# Patient Record
Sex: MALE | Race: WHITE | Employment: FULL TIME | URBAN - METROPOLITAN AREA
[De-identification: names, ages, dates, MRNs, and addresses within clinical notes are randomized per-mention and may not be internally consistent; named-entity substitution may affect disease eponyms.]

---

## 2020-06-19 ENCOUNTER — HOSPITAL ENCOUNTER (OUTPATIENT)
Dept: PREADMISSION TESTING | Age: 65
Discharge: HOME | End: 2020-06-19
Payer: COMMERCIAL

## 2020-06-19 DIAGNOSIS — Z01.818 PREOPERATIVE EXAMINATION, UNSPECIFIED: ICD-10-CM

## 2020-06-19 LAB
ALBUMIN SERPL-MCNC: 4.4 G/DL (ref 3.4–5)
ALBUMIN/GLOB SERPL: 1.5 {RATIO} (ref 0.8–1.7)
ALP SERPL-CCNC: 52 U/L (ref 45–117)
ALT SERPL-CCNC: 32 U/L (ref 16–61)
ANION GAP SERPL CALC-SCNC: 5 MMOL/L (ref 3–18)
APTT PPP: 25.5 SEC (ref 23–36.4)
AST SERPL-CCNC: 30 U/L (ref 10–38)
ATRIAL RATE: 59 BPM
BASOPHILS # BLD: 0 K/UL (ref 0–0.1)
BASOPHILS NFR BLD: 0 % (ref 0–2)
BILIRUB SERPL-MCNC: 1.2 MG/DL (ref 0.2–1)
BUN SERPL-MCNC: 14 MG/DL (ref 7–18)
BUN/CREAT SERPL: 17 (ref 12–20)
CALCIUM SERPL-MCNC: 9.3 MG/DL (ref 8.5–10.1)
CALCULATED P AXIS, ECG09: 69 DEGREES
CALCULATED R AXIS, ECG10: -3 DEGREES
CALCULATED T AXIS, ECG11: 53 DEGREES
CHLORIDE SERPL-SCNC: 103 MMOL/L (ref 100–111)
CO2 SERPL-SCNC: 31 MMOL/L (ref 21–32)
CREAT SERPL-MCNC: 0.84 MG/DL (ref 0.6–1.3)
DIAGNOSIS, 93000: NORMAL
DIFFERENTIAL METHOD BLD: ABNORMAL
EOSINOPHIL # BLD: 0 K/UL (ref 0–0.4)
EOSINOPHIL NFR BLD: 0 % (ref 0–5)
ERYTHROCYTE [DISTWIDTH] IN BLOOD BY AUTOMATED COUNT: 13.3 % (ref 11.6–14.5)
ERYTHROCYTE [SEDIMENTATION RATE] IN BLOOD: 4 MM/HR (ref 0–20)
EST. AVERAGE GLUCOSE BLD GHB EST-MCNC: 123 MG/DL
GLOBULIN SER CALC-MCNC: 2.9 G/DL (ref 2–4)
GLUCOSE SERPL-MCNC: 122 MG/DL (ref 74–99)
HBA1C MFR BLD: 5.9 % (ref 4.2–5.6)
HCT VFR BLD AUTO: 43.4 % (ref 36–48)
HGB BLD-MCNC: 14.1 G/DL (ref 13–16)
INR PPP: 0.9 (ref 0.8–1.2)
LYMPHOCYTES # BLD: 0.8 K/UL (ref 0.9–3.6)
LYMPHOCYTES NFR BLD: 16 % (ref 21–52)
MCH RBC QN AUTO: 33.1 PG (ref 24–34)
MCHC RBC AUTO-ENTMCNC: 32.5 G/DL (ref 31–37)
MCV RBC AUTO: 101.9 FL (ref 74–97)
MONOCYTES # BLD: 0.4 K/UL (ref 0.05–1.2)
MONOCYTES NFR BLD: 9 % (ref 3–10)
NEUTS SEG # BLD: 3.7 K/UL (ref 1.8–8)
NEUTS SEG NFR BLD: 75 % (ref 40–73)
P-R INTERVAL, ECG05: 208 MS
PLATELET # BLD AUTO: 263 K/UL (ref 135–420)
PMV BLD AUTO: 9.4 FL (ref 9.2–11.8)
POTASSIUM SERPL-SCNC: 4.2 MMOL/L (ref 3.5–5.5)
PROT SERPL-MCNC: 7.3 G/DL (ref 6.4–8.2)
PROTHROMBIN TIME: 12.2 SEC (ref 11.5–15.2)
Q-T INTERVAL, ECG07: 440 MS
QRS DURATION, ECG06: 114 MS
QTC CALCULATION (BEZET), ECG08: 435 MS
RBC # BLD AUTO: 4.26 M/UL (ref 4.7–5.5)
SODIUM SERPL-SCNC: 139 MMOL/L (ref 136–145)
VENTRICULAR RATE, ECG03: 59 BPM
WBC # BLD AUTO: 4.9 K/UL (ref 4.6–13.2)

## 2020-06-19 PROCEDURE — 93005 ELECTROCARDIOGRAM TRACING: CPT

## 2020-06-19 PROCEDURE — 36415 COLL VENOUS BLD VENIPUNCTURE: CPT

## 2020-06-19 PROCEDURE — 83036 HEMOGLOBIN GLYCOSYLATED A1C: CPT

## 2020-06-19 PROCEDURE — 80053 COMPREHEN METABOLIC PANEL: CPT

## 2020-06-19 PROCEDURE — 85652 RBC SED RATE AUTOMATED: CPT

## 2020-06-19 PROCEDURE — 87086 URINE CULTURE/COLONY COUNT: CPT

## 2020-06-19 PROCEDURE — 85610 PROTHROMBIN TIME: CPT

## 2020-06-19 PROCEDURE — 85730 THROMBOPLASTIN TIME PARTIAL: CPT

## 2020-06-19 PROCEDURE — 85025 COMPLETE CBC W/AUTO DIFF WBC: CPT

## 2020-06-20 LAB
BACTERIA SPEC CULT: NORMAL
SERVICE CMNT-IMP: NORMAL

## 2020-06-21 LAB
BACTERIA SPEC CULT: NORMAL
BACTERIA SPEC CULT: NORMAL
SERVICE CMNT-IMP: NORMAL

## 2020-06-22 LAB
FAX TO INFO,FAXT: NORMAL
FAX TO NUMBER,FAXN: NORMAL

## 2020-07-02 ENCOUNTER — HOSPITAL ENCOUNTER (OUTPATIENT)
Dept: PREADMISSION TESTING | Age: 65
Discharge: HOME | End: 2020-07-02
Attending: ORTHOPAEDIC SURGERY
Payer: COMMERCIAL

## 2020-07-02 LAB
APPEARANCE UR: CLEAR
BILIRUB UR QL: NEGATIVE
COLOR UR: YELLOW
GLUCOSE UR STRIP.AUTO-MCNC: NEGATIVE MG/DL
HGB UR QL STRIP: NEGATIVE
KETONES UR QL STRIP.AUTO: NEGATIVE MG/DL
LEUKOCYTE ESTERASE UR QL STRIP.AUTO: NEGATIVE
NITRITE UR QL STRIP.AUTO: NEGATIVE
PH UR STRIP: 6.5 [PH] (ref 5–8)
PROT UR STRIP-MCNC: NEGATIVE MG/DL
SP GR UR REFRACTOMETRY: <1.005 (ref 1–1.03)
UROBILINOGEN UR QL STRIP.AUTO: 0.2 EU/DL (ref 0.2–1)

## 2020-07-02 PROCEDURE — 87635 SARS-COV-2 COVID-19 AMP PRB: CPT

## 2020-07-02 PROCEDURE — 81003 URINALYSIS AUTO W/O SCOPE: CPT

## 2020-07-03 LAB — SARS-COV-2, COV2NT: NOT DETECTED

## 2020-07-08 ENCOUNTER — ANESTHESIA EVENT (OUTPATIENT)
Dept: SURGERY | Age: 65
DRG: 855 | End: 2020-07-08
Payer: COMMERCIAL

## 2020-07-08 PROBLEM — M17.12 OSTEOARTHRITIS OF LEFT KNEE: Chronic | Status: ACTIVE | Noted: 2020-07-08

## 2020-07-08 RX ORDER — ACETAMINOPHEN 500 MG
1000 TABLET ORAL ONCE
Status: CANCELLED | OUTPATIENT
Start: 2020-07-08 | End: 2020-07-08

## 2020-07-09 ENCOUNTER — HOSPITAL ENCOUNTER (OUTPATIENT)
Age: 65
Discharge: HOME HEALTH | DRG: 855 | End: 2020-07-09
Attending: ORTHOPAEDIC SURGERY | Admitting: ORTHOPAEDIC SURGERY
Payer: COMMERCIAL

## 2020-07-09 ENCOUNTER — HOME HEALTH ADMISSION (OUTPATIENT)
Dept: HOME HEALTH SERVICES | Facility: HOME HEALTH | Age: 65
End: 2020-07-09
Payer: COMMERCIAL

## 2020-07-09 ENCOUNTER — APPOINTMENT (OUTPATIENT)
Dept: GENERAL RADIOLOGY | Age: 65
DRG: 855 | End: 2020-07-09
Attending: PHYSICIAN ASSISTANT
Payer: COMMERCIAL

## 2020-07-09 ENCOUNTER — ANESTHESIA (OUTPATIENT)
Dept: SURGERY | Age: 65
DRG: 855 | End: 2020-07-09
Payer: COMMERCIAL

## 2020-07-09 VITALS
OXYGEN SATURATION: 100 % | SYSTOLIC BLOOD PRESSURE: 160 MMHG | RESPIRATION RATE: 15 BRPM | DIASTOLIC BLOOD PRESSURE: 86 MMHG | BODY MASS INDEX: 25.04 KG/M2 | HEART RATE: 54 BPM | HEIGHT: 69 IN | WEIGHT: 169.06 LBS | TEMPERATURE: 97.8 F

## 2020-07-09 DIAGNOSIS — M17.12 PRIMARY OSTEOARTHRITIS OF LEFT KNEE: Primary | Chronic | ICD-10-CM

## 2020-07-09 LAB
ABO + RH BLD: NORMAL
BLOOD GROUP ANTIBODIES SERPL: NORMAL
GLUCOSE BLD STRIP.AUTO-MCNC: 148 MG/DL (ref 70–110)
SPECIMEN EXP DATE BLD: NORMAL

## 2020-07-09 PROCEDURE — 97165 OT EVAL LOW COMPLEX 30 MIN: CPT

## 2020-07-09 PROCEDURE — 64447 NJX AA&/STRD FEMORAL NRV IMG: CPT | Performed by: SPECIALIST

## 2020-07-09 PROCEDURE — 74011000250 HC RX REV CODE- 250: Performed by: SPECIALIST

## 2020-07-09 PROCEDURE — 76210000016 HC OR PH I REC 1 TO 1.5 HR: Performed by: ORTHOPAEDIC SURGERY

## 2020-07-09 PROCEDURE — 74011250637 HC RX REV CODE- 250/637: Performed by: SPECIALIST

## 2020-07-09 PROCEDURE — 74011250636 HC RX REV CODE- 250/636: Performed by: SPECIALIST

## 2020-07-09 PROCEDURE — 74011250636 HC RX REV CODE- 250/636: Performed by: ORTHOPAEDIC SURGERY

## 2020-07-09 PROCEDURE — 74011000258 HC RX REV CODE- 258: Performed by: ORTHOPAEDIC SURGERY

## 2020-07-09 PROCEDURE — 77030003666 HC NDL SPINAL BD -A: Performed by: ORTHOPAEDIC SURGERY

## 2020-07-09 PROCEDURE — 97161 PT EVAL LOW COMPLEX 20 MIN: CPT

## 2020-07-09 PROCEDURE — 77030020782 HC GWN BAIR PAWS FLX 3M -B: Performed by: ORTHOPAEDIC SURGERY

## 2020-07-09 PROCEDURE — 76942 ECHO GUIDE FOR BIOPSY: CPT | Performed by: ORTHOPAEDIC SURGERY

## 2020-07-09 PROCEDURE — C1776 JOINT DEVICE (IMPLANTABLE): HCPCS | Performed by: ORTHOPAEDIC SURGERY

## 2020-07-09 PROCEDURE — 74011000250 HC RX REV CODE- 250: Performed by: NURSE ANESTHETIST, CERTIFIED REGISTERED

## 2020-07-09 PROCEDURE — 0SRD0J9 REPLACEMENT OF LEFT KNEE JOINT WITH SYNTHETIC SUBSTITUTE, CEMENTED, OPEN APPROACH: ICD-10-PCS | Performed by: ORTHOPAEDIC SURGERY

## 2020-07-09 PROCEDURE — 77030038010: Performed by: ORTHOPAEDIC SURGERY

## 2020-07-09 PROCEDURE — 77030016060 HC NDL NRV BLK TELE -A: Performed by: SPECIALIST

## 2020-07-09 PROCEDURE — 74011250636 HC RX REV CODE- 250/636: Performed by: PHYSICIAN ASSISTANT

## 2020-07-09 PROCEDURE — 77030037713 HC CLOSR DEV INCIS ZIP STRY -B: Performed by: ORTHOPAEDIC SURGERY

## 2020-07-09 PROCEDURE — 74011250636 HC RX REV CODE- 250/636: Performed by: NURSE ANESTHETIST, CERTIFIED REGISTERED

## 2020-07-09 PROCEDURE — 77030040361 HC SLV COMPR DVT MDII -B: Performed by: ORTHOPAEDIC SURGERY

## 2020-07-09 PROCEDURE — 77030018836 HC SOL IRR NACL ICUM -A: Performed by: ORTHOPAEDIC SURGERY

## 2020-07-09 PROCEDURE — 86900 BLOOD TYPING SEROLOGIC ABO: CPT

## 2020-07-09 PROCEDURE — 36415 COLL VENOUS BLD VENIPUNCTURE: CPT

## 2020-07-09 PROCEDURE — 77010033678 HC OXYGEN DAILY

## 2020-07-09 PROCEDURE — 76010000153 HC OR TIME 1.5 TO 2 HR: Performed by: ORTHOPAEDIC SURGERY

## 2020-07-09 PROCEDURE — 97535 SELF CARE MNGMENT TRAINING: CPT

## 2020-07-09 PROCEDURE — 77030002934 HC SUT MCRYL J&J -B: Performed by: ORTHOPAEDIC SURGERY

## 2020-07-09 PROCEDURE — 77030014144 HC TY SPN ANES BBMI -B: Performed by: SPECIALIST

## 2020-07-09 PROCEDURE — 97116 GAIT TRAINING THERAPY: CPT

## 2020-07-09 PROCEDURE — 74011250637 HC RX REV CODE- 250/637: Performed by: PHYSICIAN ASSISTANT

## 2020-07-09 PROCEDURE — 76060000034 HC ANESTHESIA 1.5 TO 2 HR: Performed by: ORTHOPAEDIC SURGERY

## 2020-07-09 PROCEDURE — 77030039760: Performed by: ORTHOPAEDIC SURGERY

## 2020-07-09 PROCEDURE — 64447 NJX AA&/STRD FEMORAL NRV IMG: CPT | Performed by: ORTHOPAEDIC SURGERY

## 2020-07-09 PROCEDURE — C9290 INJ, BUPIVACAINE LIPOSOME: HCPCS | Performed by: ORTHOPAEDIC SURGERY

## 2020-07-09 PROCEDURE — 77030013708 HC HNDPC SUC IRR PULS STRY –B: Performed by: ORTHOPAEDIC SURGERY

## 2020-07-09 PROCEDURE — 74011000250 HC RX REV CODE- 250: Performed by: ORTHOPAEDIC SURGERY

## 2020-07-09 PROCEDURE — 77030034694 HC SCPL CANADY PLSM DISP USMD -E: Performed by: ORTHOPAEDIC SURGERY

## 2020-07-09 PROCEDURE — 77030033263 HC DRSG MEPILEX 16-48IN BORD MOLN -B: Performed by: ORTHOPAEDIC SURGERY

## 2020-07-09 PROCEDURE — C1713 ANCHOR/SCREW BN/BN,TIS/BN: HCPCS | Performed by: ORTHOPAEDIC SURGERY

## 2020-07-09 PROCEDURE — 82962 GLUCOSE BLOOD TEST: CPT

## 2020-07-09 PROCEDURE — 77030011628: Performed by: ORTHOPAEDIC SURGERY

## 2020-07-09 PROCEDURE — 73560 X-RAY EXAM OF KNEE 1 OR 2: CPT

## 2020-07-09 PROCEDURE — 77030031139 HC SUT VCRL2 J&J -A: Performed by: ORTHOPAEDIC SURGERY

## 2020-07-09 DEVICE — LT SIZE 6 FEMORAL CR NONPOROUS
Type: IMPLANTABLE DEVICE | Site: KNEE | Status: FUNCTIONAL
Brand: BKS TRIMAX

## 2020-07-09 DEVICE — CEMENT BNE 40GM FULL DOSE PMMA W/O ANTIBIO HI VISC N RADPQ: Type: IMPLANTABLE DEVICE | Site: KNEE | Status: FUNCTIONAL

## 2020-07-09 DEVICE — SIZE 6 9MM TIBIAL INSERT CR
Type: IMPLANTABLE DEVICE | Site: KNEE | Status: FUNCTIONAL
Brand: BKS E-VITALIZE

## 2020-07-09 DEVICE — 32MM PATELLA, VITAMIN E
Type: IMPLANTABLE DEVICE | Site: KNEE | Status: FUNCTIONAL
Brand: BKS E-VITALIZE

## 2020-07-09 DEVICE — SIZE 6 TIBIAL TRAY NONPOROUS
Type: IMPLANTABLE DEVICE | Site: KNEE | Status: FUNCTIONAL
Brand: BALANCED KNEE SYSTEM

## 2020-07-09 RX ORDER — GLYCOPYRROLATE 0.2 MG/ML
0.2 INJECTION INTRAMUSCULAR; INTRAVENOUS ONCE
Status: COMPLETED | OUTPATIENT
Start: 2020-07-09 | End: 2020-07-09

## 2020-07-09 RX ORDER — OXYCODONE HYDROCHLORIDE 5 MG/1
5-10 TABLET ORAL
Status: DISCONTINUED | OUTPATIENT
Start: 2020-07-09 | End: 2020-07-09 | Stop reason: HOSPADM

## 2020-07-09 RX ORDER — SODIUM CHLORIDE 0.9 % (FLUSH) 0.9 %
5-40 SYRINGE (ML) INJECTION EVERY 8 HOURS
Status: DISCONTINUED | OUTPATIENT
Start: 2020-07-09 | End: 2020-07-09 | Stop reason: HOSPADM

## 2020-07-09 RX ORDER — FENTANYL CITRATE 50 UG/ML
INJECTION, SOLUTION INTRAMUSCULAR; INTRAVENOUS AS NEEDED
Status: DISCONTINUED | OUTPATIENT
Start: 2020-07-09 | End: 2020-07-09 | Stop reason: HOSPADM

## 2020-07-09 RX ORDER — PREGABALIN 25 MG/1
25 CAPSULE ORAL ONCE
Status: COMPLETED | OUTPATIENT
Start: 2020-07-09 | End: 2020-07-09

## 2020-07-09 RX ORDER — DEXAMETHASONE SODIUM PHOSPHATE 4 MG/ML
4 INJECTION, SOLUTION INTRA-ARTICULAR; INTRALESIONAL; INTRAMUSCULAR; INTRAVENOUS; SOFT TISSUE ONCE
Status: DISCONTINUED | OUTPATIENT
Start: 2020-07-09 | End: 2020-07-09 | Stop reason: HOSPADM

## 2020-07-09 RX ORDER — MIDAZOLAM HYDROCHLORIDE 1 MG/ML
INJECTION, SOLUTION INTRAMUSCULAR; INTRAVENOUS AS NEEDED
Status: DISCONTINUED | OUTPATIENT
Start: 2020-07-09 | End: 2020-07-09 | Stop reason: HOSPADM

## 2020-07-09 RX ORDER — CEFAZOLIN SODIUM 2 G/50ML
2 SOLUTION INTRAVENOUS EVERY 8 HOURS
Status: DISCONTINUED | OUTPATIENT
Start: 2020-07-09 | End: 2020-07-09 | Stop reason: HOSPADM

## 2020-07-09 RX ORDER — OXYCODONE AND ACETAMINOPHEN 5; 325 MG/1; MG/1
1 TABLET ORAL
Qty: 42 TAB | Refills: 0 | Status: SHIPPED | OUTPATIENT
Start: 2020-07-09 | End: 2020-07-16

## 2020-07-09 RX ORDER — HYDROMORPHONE HYDROCHLORIDE 2 MG/ML
0.5 INJECTION, SOLUTION INTRAMUSCULAR; INTRAVENOUS; SUBCUTANEOUS
Status: DISCONTINUED | OUTPATIENT
Start: 2020-07-09 | End: 2020-07-09 | Stop reason: HOSPADM

## 2020-07-09 RX ORDER — CEFADROXIL 500 MG/1
500 CAPSULE ORAL 2 TIMES DAILY
Qty: 10 CAP | Refills: 0 | Status: SHIPPED | OUTPATIENT
Start: 2020-07-09 | End: 2020-07-14

## 2020-07-09 RX ORDER — ACETAMINOPHEN 500 MG
1000 TABLET ORAL ONCE
Status: COMPLETED | OUTPATIENT
Start: 2020-07-09 | End: 2020-07-09

## 2020-07-09 RX ORDER — DIPHENHYDRAMINE HYDROCHLORIDE 50 MG/ML
12.5 INJECTION, SOLUTION INTRAMUSCULAR; INTRAVENOUS
Status: DISCONTINUED | OUTPATIENT
Start: 2020-07-09 | End: 2020-07-09 | Stop reason: HOSPADM

## 2020-07-09 RX ORDER — ASPIRIN 81 MG/1
81 TABLET ORAL 2 TIMES DAILY
Qty: 42 TAB | Refills: 0 | Status: SHIPPED | OUTPATIENT
Start: 2020-07-09 | End: 2020-07-30

## 2020-07-09 RX ORDER — SODIUM CHLORIDE 9 MG/ML
300 INJECTION, SOLUTION INTRAVENOUS CONTINUOUS
Status: DISCONTINUED | OUTPATIENT
Start: 2020-07-09 | End: 2020-07-09 | Stop reason: HOSPADM

## 2020-07-09 RX ORDER — LANOLIN ALCOHOL/MO/W.PET/CERES
1 CREAM (GRAM) TOPICAL 3 TIMES DAILY
Status: DISCONTINUED | OUTPATIENT
Start: 2020-07-09 | End: 2020-07-09 | Stop reason: HOSPADM

## 2020-07-09 RX ORDER — SODIUM CHLORIDE, SODIUM LACTATE, POTASSIUM CHLORIDE, CALCIUM CHLORIDE 600; 310; 30; 20 MG/100ML; MG/100ML; MG/100ML; MG/100ML
100 INJECTION, SOLUTION INTRAVENOUS CONTINUOUS
Status: DISCONTINUED | OUTPATIENT
Start: 2020-07-09 | End: 2020-07-09 | Stop reason: HOSPADM

## 2020-07-09 RX ORDER — LIDOCAINE HYDROCHLORIDE 20 MG/ML
INJECTION, SOLUTION EPIDURAL; INFILTRATION; INTRACAUDAL; PERINEURAL AS NEEDED
Status: DISCONTINUED | OUTPATIENT
Start: 2020-07-09 | End: 2020-07-09 | Stop reason: HOSPADM

## 2020-07-09 RX ORDER — ONDANSETRON 2 MG/ML
4 INJECTION INTRAMUSCULAR; INTRAVENOUS ONCE
Status: DISCONTINUED | OUTPATIENT
Start: 2020-07-09 | End: 2020-07-09 | Stop reason: HOSPADM

## 2020-07-09 RX ORDER — ASPIRIN 81 MG/1
81 TABLET ORAL 2 TIMES DAILY
Status: DISCONTINUED | OUTPATIENT
Start: 2020-07-09 | End: 2020-07-09 | Stop reason: HOSPADM

## 2020-07-09 RX ORDER — DEXAMETHASONE SODIUM PHOSPHATE 4 MG/ML
INJECTION, SOLUTION INTRA-ARTICULAR; INTRALESIONAL; INTRAMUSCULAR; INTRAVENOUS; SOFT TISSUE
Status: COMPLETED | OUTPATIENT
Start: 2020-07-09 | End: 2020-07-09

## 2020-07-09 RX ORDER — DEXAMETHASONE SODIUM PHOSPHATE 4 MG/ML
8 INJECTION, SOLUTION INTRA-ARTICULAR; INTRALESIONAL; INTRAMUSCULAR; INTRAVENOUS; SOFT TISSUE ONCE
Status: DISCONTINUED | OUTPATIENT
Start: 2020-07-09 | End: 2020-07-09

## 2020-07-09 RX ORDER — SODIUM CHLORIDE, SODIUM LACTATE, POTASSIUM CHLORIDE, CALCIUM CHLORIDE 600; 310; 30; 20 MG/100ML; MG/100ML; MG/100ML; MG/100ML
125 INJECTION, SOLUTION INTRAVENOUS CONTINUOUS
Status: DISCONTINUED | OUTPATIENT
Start: 2020-07-09 | End: 2020-07-09 | Stop reason: HOSPADM

## 2020-07-09 RX ORDER — DEXTROSE MONOHYDRATE 100 MG/ML
125-250 INJECTION, SOLUTION INTRAVENOUS AS NEEDED
Status: DISCONTINUED | OUTPATIENT
Start: 2020-07-09 | End: 2020-07-09 | Stop reason: HOSPADM

## 2020-07-09 RX ORDER — CELECOXIB 100 MG/1
200 CAPSULE ORAL ONCE
Status: COMPLETED | OUTPATIENT
Start: 2020-07-09 | End: 2020-07-09

## 2020-07-09 RX ORDER — PANTOPRAZOLE SODIUM 40 MG/1
40 TABLET, DELAYED RELEASE ORAL DAILY
Status: DISCONTINUED | OUTPATIENT
Start: 2020-07-10 | End: 2020-07-09 | Stop reason: HOSPADM

## 2020-07-09 RX ORDER — GLYCOPYRROLATE 0.2 MG/ML
INJECTION INTRAMUSCULAR; INTRAVENOUS AS NEEDED
Status: DISCONTINUED | OUTPATIENT
Start: 2020-07-09 | End: 2020-07-09 | Stop reason: HOSPADM

## 2020-07-09 RX ORDER — ACETAMINOPHEN 325 MG/1
650 TABLET ORAL EVERY 6 HOURS
Status: DISCONTINUED | OUTPATIENT
Start: 2020-07-09 | End: 2020-07-09 | Stop reason: HOSPADM

## 2020-07-09 RX ORDER — MELOXICAM 7.5 MG/1
7.5 TABLET ORAL 2 TIMES DAILY
Qty: 28 TAB | Refills: 0 | Status: SHIPPED | OUTPATIENT
Start: 2020-07-09 | End: 2020-07-23

## 2020-07-09 RX ORDER — METOCLOPRAMIDE HYDROCHLORIDE 5 MG/ML
10 INJECTION INTRAMUSCULAR; INTRAVENOUS
Status: DISCONTINUED | OUTPATIENT
Start: 2020-07-09 | End: 2020-07-09 | Stop reason: HOSPADM

## 2020-07-09 RX ORDER — FENTANYL CITRATE 50 UG/ML
INJECTION, SOLUTION INTRAMUSCULAR; INTRAVENOUS
Status: COMPLETED | OUTPATIENT
Start: 2020-07-09 | End: 2020-07-09

## 2020-07-09 RX ORDER — ONDANSETRON 2 MG/ML
4 INJECTION INTRAMUSCULAR; INTRAVENOUS
Status: DISCONTINUED | OUTPATIENT
Start: 2020-07-09 | End: 2020-07-09 | Stop reason: HOSPADM

## 2020-07-09 RX ORDER — DEXMEDETOMIDINE HYDROCHLORIDE 100 UG/ML
INJECTION, SOLUTION INTRAVENOUS
Status: COMPLETED | OUTPATIENT
Start: 2020-07-09 | End: 2020-07-09

## 2020-07-09 RX ORDER — OXYCODONE AND ACETAMINOPHEN 5; 325 MG/1; MG/1
1 TABLET ORAL AS NEEDED
Status: DISCONTINUED | OUTPATIENT
Start: 2020-07-09 | End: 2020-07-09 | Stop reason: HOSPADM

## 2020-07-09 RX ORDER — ZOLPIDEM TARTRATE 5 MG/1
5-10 TABLET ORAL
Status: DISCONTINUED | OUTPATIENT
Start: 2020-07-09 | End: 2020-07-09 | Stop reason: HOSPADM

## 2020-07-09 RX ORDER — CEFAZOLIN SODIUM 2 G/50ML
2 SOLUTION INTRAVENOUS ONCE
Status: COMPLETED | OUTPATIENT
Start: 2020-07-09 | End: 2020-07-09

## 2020-07-09 RX ORDER — PROPOFOL 10 MG/ML
INJECTION, EMULSION INTRAVENOUS
Status: DISCONTINUED | OUTPATIENT
Start: 2020-07-09 | End: 2020-07-09 | Stop reason: HOSPADM

## 2020-07-09 RX ORDER — TRANEXAMIC ACID 650 1/1
1950 TABLET ORAL ONCE
Status: COMPLETED | OUTPATIENT
Start: 2020-07-09 | End: 2020-07-09

## 2020-07-09 RX ORDER — MAGNESIUM SULFATE 100 %
4 CRYSTALS MISCELLANEOUS AS NEEDED
Status: DISCONTINUED | OUTPATIENT
Start: 2020-07-09 | End: 2020-07-09 | Stop reason: HOSPADM

## 2020-07-09 RX ORDER — KETOROLAC TROMETHAMINE 15 MG/ML
15 INJECTION, SOLUTION INTRAMUSCULAR; INTRAVENOUS
Status: DISCONTINUED | OUTPATIENT
Start: 2020-07-09 | End: 2020-07-09 | Stop reason: HOSPADM

## 2020-07-09 RX ORDER — SODIUM CHLORIDE 0.9 % (FLUSH) 0.9 %
5-40 SYRINGE (ML) INJECTION AS NEEDED
Status: DISCONTINUED | OUTPATIENT
Start: 2020-07-09 | End: 2020-07-09 | Stop reason: HOSPADM

## 2020-07-09 RX ORDER — FENTANYL CITRATE 50 UG/ML
50 INJECTION, SOLUTION INTRAMUSCULAR; INTRAVENOUS
Status: DISCONTINUED | OUTPATIENT
Start: 2020-07-09 | End: 2020-07-09 | Stop reason: HOSPADM

## 2020-07-09 RX ORDER — ROPIVACAINE HYDROCHLORIDE 5 MG/ML
INJECTION, SOLUTION EPIDURAL; INFILTRATION; PERINEURAL
Status: COMPLETED | OUTPATIENT
Start: 2020-07-09 | End: 2020-07-09

## 2020-07-09 RX ORDER — KETAMINE HYDROCHLORIDE 10 MG/ML
INJECTION, SOLUTION INTRAMUSCULAR; INTRAVENOUS AS NEEDED
Status: DISCONTINUED | OUTPATIENT
Start: 2020-07-09 | End: 2020-07-09 | Stop reason: HOSPADM

## 2020-07-09 RX ORDER — NALOXONE HYDROCHLORIDE 0.4 MG/ML
0.4 INJECTION, SOLUTION INTRAMUSCULAR; INTRAVENOUS; SUBCUTANEOUS AS NEEDED
Status: DISCONTINUED | OUTPATIENT
Start: 2020-07-09 | End: 2020-07-09 | Stop reason: HOSPADM

## 2020-07-09 RX ORDER — SODIUM CHLORIDE 9 MG/ML
125 INJECTION, SOLUTION INTRAVENOUS CONTINUOUS
Status: DISCONTINUED | OUTPATIENT
Start: 2020-07-09 | End: 2020-07-09 | Stop reason: HOSPADM

## 2020-07-09 RX ORDER — GLYCOPYRROLATE 0.2 MG/ML
0.1 INJECTION INTRAMUSCULAR; INTRAVENOUS ONCE
Status: DISCONTINUED | OUTPATIENT
Start: 2020-07-09 | End: 2020-07-09

## 2020-07-09 RX ORDER — KETOROLAC TROMETHAMINE 30 MG/ML
15 INJECTION, SOLUTION INTRAMUSCULAR; INTRAVENOUS EVERY 6 HOURS
Status: DISCONTINUED | OUTPATIENT
Start: 2020-07-09 | End: 2020-07-09 | Stop reason: HOSPADM

## 2020-07-09 RX ORDER — FENTANYL CITRATE 50 UG/ML
25 INJECTION, SOLUTION INTRAMUSCULAR; INTRAVENOUS AS NEEDED
Status: DISCONTINUED | OUTPATIENT
Start: 2020-07-09 | End: 2020-07-09 | Stop reason: HOSPADM

## 2020-07-09 RX ORDER — DOCUSATE SODIUM 100 MG/1
100 CAPSULE, LIQUID FILLED ORAL DAILY
Status: DISCONTINUED | OUTPATIENT
Start: 2020-07-10 | End: 2020-07-09 | Stop reason: HOSPADM

## 2020-07-09 RX ADMIN — CELECOXIB 200 MG: 100 CAPSULE ORAL at 10:11

## 2020-07-09 RX ADMIN — MIDAZOLAM 2 MG: 1 INJECTION INTRAMUSCULAR; INTRAVENOUS at 11:40

## 2020-07-09 RX ADMIN — LIDOCAINE HYDROCHLORIDE 60 MG: 20 INJECTION, SOLUTION EPIDURAL; INFILTRATION; INTRACAUDAL; PERINEURAL at 11:56

## 2020-07-09 RX ADMIN — SODIUM CHLORIDE, SODIUM LACTATE, POTASSIUM CHLORIDE, AND CALCIUM CHLORIDE: 600; 310; 30; 20 INJECTION, SOLUTION INTRAVENOUS at 12:07

## 2020-07-09 RX ADMIN — FENTANYL CITRATE 85 MCG: 50 INJECTION, SOLUTION INTRAMUSCULAR; INTRAVENOUS at 11:56

## 2020-07-09 RX ADMIN — ROPIVACAINE HYDROCHLORIDE 25 ML: 5 INJECTION, SOLUTION EPIDURAL; INFILTRATION; PERINEURAL at 12:47

## 2020-07-09 RX ADMIN — SODIUM CHLORIDE, SODIUM LACTATE, POTASSIUM CHLORIDE, AND CALCIUM CHLORIDE 125 ML/HR: 600; 310; 30; 20 INJECTION, SOLUTION INTRAVENOUS at 11:05

## 2020-07-09 RX ADMIN — DEXAMETHASONE SODIUM PHOSPHATE 4 MG: 4 INJECTION, SOLUTION INTRAMUSCULAR; INTRAVENOUS at 12:47

## 2020-07-09 RX ADMIN — MEPIVACAINE HYDROCHLORIDE 60 MG: 20 INJECTION, SOLUTION EPIDURAL; INFILTRATION at 12:49

## 2020-07-09 RX ADMIN — DEXMEDETOMIDINE HYDROCHLORIDE 15 MCG: 100 INJECTION, SOLUTION INTRAVENOUS at 12:47

## 2020-07-09 RX ADMIN — SODIUM CHLORIDE, SODIUM LACTATE, POTASSIUM CHLORIDE, AND CALCIUM CHLORIDE 1000 ML: 600; 310; 30; 20 INJECTION, SOLUTION INTRAVENOUS at 11:05

## 2020-07-09 RX ADMIN — KETAMINE HYDROCHLORIDE 20 MG: 10 INJECTION, SOLUTION INTRAMUSCULAR; INTRAVENOUS at 11:56

## 2020-07-09 RX ADMIN — CEFAZOLIN SODIUM 2 G: 2 SOLUTION INTRAVENOUS at 11:42

## 2020-07-09 RX ADMIN — KETAMINE HYDROCHLORIDE 30 MG: 10 INJECTION, SOLUTION INTRAMUSCULAR; INTRAVENOUS at 11:27

## 2020-07-09 RX ADMIN — MIDAZOLAM 2 MG: 1 INJECTION INTRAMUSCULAR; INTRAVENOUS at 11:27

## 2020-07-09 RX ADMIN — GLYCOPYRROLATE 0.2 MG: 0.2 INJECTION, SOLUTION INTRAMUSCULAR; INTRAVENOUS at 14:22

## 2020-07-09 RX ADMIN — SODIUM CHLORIDE 300 ML/HR: 900 INJECTION, SOLUTION INTRAVENOUS at 15:51

## 2020-07-09 RX ADMIN — TRANEXAMIC ACID 1950 MG: 650 TABLET ORAL at 10:11

## 2020-07-09 RX ADMIN — SODIUM CHLORIDE, SODIUM LACTATE, POTASSIUM CHLORIDE, AND CALCIUM CHLORIDE 125 ML/HR: 600; 310; 30; 20 INJECTION, SOLUTION INTRAVENOUS at 14:24

## 2020-07-09 RX ADMIN — GLYCOPYRROLATE 0.2 MG: 0.2 INJECTION INTRAMUSCULAR; INTRAVENOUS at 11:40

## 2020-07-09 RX ADMIN — PROPOFOL 120 MCG/KG/MIN: 10 INJECTION, EMULSION INTRAVENOUS at 11:56

## 2020-07-09 RX ADMIN — FENTANYL CITRATE 15 MCG: 50 INJECTION, SOLUTION INTRAMUSCULAR; INTRAVENOUS at 12:49

## 2020-07-09 RX ADMIN — ACETAMINOPHEN 1000 MG: 500 TABLET ORAL at 10:11

## 2020-07-09 RX ADMIN — PREGABALIN 25 MG: 25 CAPSULE ORAL at 10:11

## 2020-07-09 NOTE — PROGRESS NOTES
Maci care of pt at this time. Assessment complete. Pt alert and oriented x 4. Shows no sign of distress. Fall risk arm band in place. Denies SOB and chest pain. Pt lungs clear bilaterally. Cap refill  less than 3 seconds. Pt states numbness to LLE had spinal. But denies tingling to all extremities. Stated pain 0/10. Pt has 18 G IV to L forearm. Pt has optifoam mepilex dressing to left knee CDI . Plexis and TEDs applied to BLE. Incentive spirometer at bedside. Pt encouraged to continue use of IS. Pt verbalized understanding. Ice pack applied. Call light and possessions within reach. Bed locked and in low position. Will continue to monitor. 26  Pt able to lift left leg and states feeling to left leg.    1601  Updated wife of pt status. Answered all questions and concerns    1632  Pt ambulating in hallway with PT    1651  Pt has cleared PT and voided post op. Pain 0/10 IV removed pt ready for d/c    1652  Dual AVS reviewed with SHELLIE villasenor rn. All medications reviewed individually with patient. Opportunities for questions and concerns provided. Patient to be discharged via (mode of transport ie. Car, ambulance or air transport) car. Patient's arm band appropriately discarded.

## 2020-07-09 NOTE — H&P
[address] [facility] [address] Medicine  History and Physical Exam    Patient: Sailaja Chapa MRN: [id number]  SSN: [id number]    YOB: 1955  Age: 72 y.o. Sex: male      Subjective:      Chief Complaint: Left knee pain    History of Present Illness:  Patient complains of pain to the left knee and difficulty ambulating, which has progressively worsened over several months. X-rays showed osteoarthritis of the joint. The patient's pain has persisted and progressed despite conservative treatments and therapies. The patient has been previously treated with medications/injections. The patient has at this time opted for surgical intervention. Past Medical History:   Diagnosis Date    Arthritis     [facility]     rheumatoid arthritis    Osteoarthritis of left knee 7/8/2020     Past Surgical History:   Procedure Laterality Date    HX ORTHOPAEDIC  2019    right carpal tunnel release     HX ROTATOR CUFF REPAIR  2014    left     Social History     Occupational History    Not on file   Tobacco Use    Smoking status: Former Smoker    Smokeless tobacco: Never Used   Substance and Sexual Activity    Alcohol use: Yes     Alcohol/week: 4.0 standard drinks     Types: 4 Cans of beer per week    Drug use: Never    Sexual activity: Not on file     Prior to Admission medications    Medication Sig Start Date End Date Taking? Authorizing Provider   cholecalciferol (Vitamin D3) 25 mcg (1,000 unit) cap Take 1,000 Units by mouth daily. Provider, Historical   tofacitinib [place]) 5 mg tab Take 5 mg by mouth two (2) times a day. Indications: rheumatoid arthritis    Provider, Historical   methotrexate (RHEUMATREX) 2.5 mg tablet Take 15 mg by mouth every Monday. Provider, Historical   folic acid (FOLVITE) 1 mg tablet Take 1 mg by mouth daily. Provider, Historical       Allergies:    Allergies   Allergen Reactions    Sulfa (Sulfonamide Antibiotics) Diarrhea        Review of Systems:  Pertinent items are noted in the History of Present Illness. Objective:       Physical Exam:  HEENT: Normocephalic, atraumatic  Lungs:  Clear to auscultation  Heart:   Regular rate and rhythm  Abdomen: Soft  Extremities:  Pain with range of motion of the left knee. Active ROM limited due to pain. Tenderness generalized. Crepitus present. Antalgic gait. Assessment:      Arthritis of the left knee. Plan:       Proceed with scheduled LEFT TOTAL KNEE ARTHROPLASTY. The various methods of treatment have been discussed with the patient and family. After consideration of risks, benefits, and other options for treatment, the patient has consented to surgical interventions. Questions were answered and preoperative teaching was done by Dr Delma Moise.      Signed By: Zahira Hahn     July 8, 2020

## 2020-07-09 NOTE — ANESTHESIA PREPROCEDURE EVALUATION
Relevant Problems   No relevant active problems       Anesthetic History   No history of anesthetic complications     Pertinent negatives: No PONV       Review of Systems / Medical History  Patient summary reviewed, nursing notes reviewed and pertinent labs reviewed    Pulmonary              Pertinent negatives: No COPD, asthma and smoker     Neuro/Psych   Within defined limits           Cardiovascular  Within defined limits              Pertinent negatives: No hypertension, past MI and CAD       GI/Hepatic/Renal  Within defined limits           Pertinent negatives: No GERD, liver disease and renal disease   Endo/Other        Arthritis  Pertinent negatives: No diabetes   Other Findings   Comments: etoh 12/ week           Physical Exam    Airway  Mallampati: I  TM Distance: 4 - 6 cm  Neck ROM: normal range of motion   Mouth opening: Normal     Cardiovascular               Dental      Comments: Caps are already missing. Pulmonary                 Abdominal         Other Findings            Anesthetic Plan    ASA: 2  Anesthesia type: regional and spinal - femoral single shot          Induction: Intravenous  Anesthetic plan and risks discussed with: Patient      Risk of nerve injury discussed. Can be expressed by pain, numbness, weakness. May or may not recover. In choosing the spinal, there is another choice. The drug your surgeon prefers is called Mepivacaine. This drug has been used since 1956 for spinals and much more commonly for the past 15 years - especially at the [facility] an orthopedic hospital in [facility] which claims to be the [place] #1 in orthopedics for 10 straight years. However, it is not approved by the FDA for use in spinal. The bottle even states not for spinal use. It will never be approved as it is generic and there is nobody that is willing to put up the millions it would take to get it approved for this use. The alternative is Bupivacaine.  It was approved > 35 years ago for spinal use while it was still a patented drug. The problem with this drug is that it lasts so long that it may interfere with your physical therapy today.      Patient chooses Joaquín Sanchez

## 2020-07-09 NOTE — PERIOP NOTES
TRANSFER - IN REPORT:    Verbal report received from ORN & CRNA on [place]  being received from OR (unit) for routine post - op      Report consisted of patients Situation, Background, Assessment and   Recommendations(SBAR). Information from the following report(s) SBAR was reviewed with the receiving nurse. Opportunity for questions and clarification was provided. Assessment completed upon patients arrival to unit and care assumed.

## 2020-07-09 NOTE — ROUTINE PROCESS
TRANSFER - IN REPORT: 
 
Verbal report received from BRAULIO Cormier rn(name) on [place]  being received from [facility]) for routine post - op Report consisted of patients Situation, Background, Assessment and  
Recommendations(SBAR). Information from the following report(s) SBAR, Kardex, [facility] and Recent Results was reviewed with the receiving nurse. Opportunity for questions and clarification was provided. Assessment completed upon patients arrival to unit and care assumed.

## 2020-07-09 NOTE — PERIOP NOTES
Briefed Dr. Joanna Mares regarding heart rate 40's and down as as low as 40, with BP trend in PACU. NO orders.  Patient is drowsy and alert

## 2020-07-09 NOTE — DISCHARGE INSTRUCTIONS
[address] [place] [address] Sports Medicine   Patient Discharge Instructions    Falguni Bella / [id number] : 1955    Admitted 2020 Discharged: 2020     IF YOU HAVE ANY PROBLEMS ONCE YOU ARE AT HOME CALL THE FOLLOWING NUMBERS:   Main office number: [phone]    Your follow up appointment to see either Dr. Nathaniel Hurd PA-C, or [facility] as scheduled in 2 weeks. If you are unsure of your appointment date call the office at [phone]. Medication Instructions     · Resume your home medictions as directed, you may have directed not to resume supplements until after your follow up. · A prescription for pain medication has been given   · It is important that you take the medication exactly as they are prescribed. · Keep your medication in the bottles provided by the pharmacist and keep a list of the medication names, dosages, and times to be taken in your wallet. · Do not take other medications without consulting your doctor. What to do at [address] [place] [address] Ave your prehospital diet. If you have excessive nausea or vomitting call your doctor's office. Be sure to maintain adequate fluid intake. Some pain medications may cause constipation. Remember to drink fluids, stay as active as possible, and eat plenty of fiber-rich foods. Begin In-Home Physical Therapy; 3 times a week to work on gait training, range of motion, strengthening, and weight bearing exercises as tolerable. Continue to use your walker or cane when walking. May progress from the walker to a cane to complete total bearing as tolerable. Patient may shower. Wrap incision with plastic wrap/covering to prevent incision from getting wet. Avoid complete immersion. YOUR DRESSING SHOULD BE CHANGED IN 3-5 DAYS BY [facility] NURSE.       When to Call    - Call if you have a temperature greater then 101  - Unable to keep food down  - Are unable to bear any wieght   - Need a pain medication refill     Information obtained by :  I understand that if any problems occur once I am at home I am to contact my physician. I understand and acknowledge receipt of the instructions indicated above.                                                                                                                                            Physician's or R.N.'s Signature                                                                  Date/Time                                                                                                                                              Patient or Representative Signature                                                          Date/Time

## 2020-07-09 NOTE — ANESTHESIA PROCEDURE NOTES
Peripheral Block    Start time: 7/15/2020 11:27 AM  End time: 7/15/2020 11:33 AM  Performed by: Jesica Mccormick MD  Authorized by: Jesica Mccormick MD       Pre-procedure: Indications: at surgeon's request and post-op pain management    Preanesthetic Checklist: patient identified, risks and benefits discussed, site marked, timeout performed, anesthesia consent given and patient being monitored      Block Type:   Block Type:  Femoral single shot and adductor canal  Laterality:  Left  Monitoring:  Standard ASA monitoring, continuous pulse ox, frequent vital sign checks, heart rate, responsive to questions and oxygen  Injection Technique:  Single shot  Procedures: ultrasound guided    Patient Position: supine  Prep: chlorhexidine    Needle Type:  Stimuplex  Needle Gauge:  22 G    Assessment:  Number of attempts:  1  Injection Assessment:  Incremental injection every 5 mL, local visualized surrounding nerve on ultrasound, negative aspiration for CSF, negative aspiration for blood, no paresthesia, no intravascular symptoms and ultrasound image on chart  Patient tolerance:  Patient tolerated the procedure well with no immediate complications  William Ran   = [id number]  CSN = [id number]    Femoral nerve identified by ultrasound proximal adductor canal.    90 mm Stimuplex Ultra. Local anesthetic injected without resistance and with gentle aspiration every 3-5 ml with direct visualization with ultrasound     Patient tolerated procedure well, vital signs stable throughout, with no apparent complications. Entire procedure completed prior to start of anesthesia time.      William Ran   = [id number]  CSN = [id number]

## 2020-07-09 NOTE — PROGRESS NOTES
Problem: Self Care Deficits Care Plan (Adult)  Goal: *Acute Goals and Plan of Care (Insert Text)  Description: Initial Occupational Therapy Goals (7/9/2020) Within 7 day(s):    1. Patient will perform grooming standing sinkside with supervision for increased independence in ADLs. 2. Patient will perform UB dressing with supervision seated EOB for increased independence with ADLs. 3. Patient will perform LB dressing with supervision & A/E PRN for increased independence with ADLs. 4. Patient will perform all aspects of toileting with supervision for increased independence with ADLs. 5. Patient will perform LB ADLs utilizing body mechanics & adaptive strategies with 1 verbal cue for increased safety in ADLs. 6. Patient will independently apply energy conservation techniques with 1 verbal cue(s)for increased independence with ADLs. Outcome: Progressing Towards Goal   OCCUPATIONAL THERAPY EVALUATION    Patient: Marylen Ro (89 y.o. male)  Date: 7/9/2020  Primary Diagnosis: Osteoarthritis of left knee [M17.12]  Procedure(s) (LRB):  LEFT TOTAL KNEE REPLACEMENT (Left) Day of Surgery   Precautions: Fall, WBAT  PLOF: pt mod I for ADLs    ASSESSMENT AND RECOMMENDATIONS:  Based on the objective data described below, the patient presents with LLE decreased ROM and strength affecting LE ADLs. Pt able to sit up to EOB without assist, and completed upper body dressing with supervision. Pt able to thread B feet through underwear/pants without assist and SBA when standing to pull up to waist. Patient able to utilize RLE ankle-over-knee technique and bending forward with LLE for sock donning. Pt able to don B shoes with cloth laces without assist. Pt SBA for bathroom mobility and was able to void standing at toilet. Pt ambulated back to seated EOB.  Patient will benefit from skilled Occupational Therapy intervention to maximize safety/independence with ADLs at d/c.    Education: Reviewed home safety, body mechanics, importance of moving every hour to prevent joint stiffness, role of ice for edema/pain control, Rolling Walker management/safety, and adaptive dressing techniques with patient verbalizing  understanding at this time     Patient will benefit from skilled intervention to address the above impairments. Patient's rehabilitation potential is considered to be Good  Factors which may influence rehabilitation potential include:   []             None noted  []             Mental ability/status  []             Medical condition  []             Home/family situation and support systems  []             Safety awareness  []             Pain tolerance/management  []             Other:        PLAN :  Recommendations and Planned Interventions:   [x]               Self Care Training                  [x]      Therapeutic Activities  [x]               Functional Mobility Training   []      Cognitive Retraining  [x]               Therapeutic Exercises           [x]      Endurance Activities  [x]               Balance Training                    []      Neuromuscular Re-Education  []               Visual/Perceptual Training     [x]      Home Safety Training  [x]               Patient Education                   []      Family Training/Education  []               Other (comment):    Frequency/Duration: Patient will be followed by Occupational Therapy 1-2 times per day/4-7 days per week to address goals. Discharge Recommendations: Home Health  Further Equipment Recommendations for Discharge: N/A     SUBJECTIVE:   Patient stated i'm pretty good.     OBJECTIVE DATA SUMMARY:     Past Medical History:   Diagnosis Date    Arthritis     Autoimmune disease ([facility]     rheumatoid arthritis    Osteoarthritis of left knee 7/8/2020     Past Surgical History:   Procedure Laterality Date    HX ORTHOPAEDIC  2019    right carpal tunnel release     HX ROTATOR CUFF REPAIR  2014    left     Barriers to Learning/Limitations: yes;  physical  Compensate with: visual, verbal, tactile, kinesthetic cues/model    Home Situation/Prior Level of Function: pt mod I for ADLs, living in one story home with spouse  Home Situation  Home Environment: Private residence  # Steps to Enter: 2  Rails to Enter: No  One/Two Story Residence: One story  Living Alone: No  Support Systems: Spouse/Significant Other/Partner  Patient Expects to be Discharged to[de-identified] Private residence  Current DME Used/Available at Home: Walker, rolling  Tub or Shower Type: Shower  []  Right hand dominant   []  Left hand dominant    Cognitive/Behavioral Status:  Neurologic State: Alert  Orientation Level: Oriented X4  Cognition: Appropriate decision making; Appropriate for age attention/concentration; Appropriate safety awareness; Follows commands  Safety/Judgement: Awareness of environment; Fall prevention    Skin: L knee incision w/ Mepilex   Edema: compression hose in place & applied ice     Coordination: BUE  Coordination: Generally decreased, functional  Fine Motor Skills-Upper: Left Intact; Right Intact    Gross Motor Skills-Upper: Left Intact; Right Intact    Balance:  Sitting: Intact  Standing: Intact; With support    Strength: BUE  Strength: Generally decreased, functional    Tone & Sensation:BUE  Tone: Normal  Sensation: Impaired    Range of Motion: BUE  AROM: Generally decreased, functional  PROM: Generally decreased, functional    Functional Mobility and Transfers for ADLs:  Bed Mobility:  Supine to Sit: Stand-by assistance  Sit to Supine: Stand-by assistance    Transfers:  Sit to Stand: Stand-by assistance   Bathroom Mobility: Stand-by assistance    ADL Assessment:  Feeding: Setup    Oral Facial Hygiene/Grooming: Setup    Bathing: Contact guard assistance    Upper Body Dressing: Supervision    Lower Body Dressing: Stand-by assistance    Toileting: Stand by assistance    ADL Intervention:  Upper Body Dressing Assistance  Dressing Assistance: Supervision  Pullover Shirt: Supervision    Lower Body Dressing Assistance  Dressing Assistance: Stand-by assistance  Underpants: Stand-by assistance  Pants With Button/Zipper: Stand-by assistance  Socks: Stand-by assistance  Position Performed: Seated edge of bed  Cues: Verbal cues provided;Visual cues provided  Adaptive Equipment Used: Reacher;Sock aid    Cognitive Retraining  Safety/Judgement: Awareness of environment; Fall prevention    Pain:  Pain level pre-treatment: 0/10  Pain level post-treatment: 0/10  Pain Intervention(s): Medication administer by Nursing (see MAR); Rest, Ice, Repositioning   Response to intervention: Nurse notified, see doc flow     Activity Tolerance:   Fair. Patient able to stand ~3 minute(s). Patient able to complete ADLs with intermittent rest breaks. Patient limited by pain, strength, ROM. Patient unsteady. Please refer to the flowsheet for vital signs taken during this treatment. After treatment:   []  Patient left in no apparent distress sitting up in chair  [x]  Patient sitting on EOB  []  Patient left in no apparent distress in bed  [x]  Call bell left within reach  [x]  Nursing notified  []  Caregiver present  [x]  Ice applied  []  SCD's on while back in bed  [] Bed alarm activated    COMMUNICATION/EDUCATION:   Communication/Collaboration:  [x]       Role of Occupational Therapy in the acute care setting. [x]      Home safety education was provided and the patient/caregiver indicated understanding. [x]      Patient/family have participated as able in goal setting and plan of care. [x]      Patient/family agree to work toward stated goals and plan of care. []      Patient understands intent and goals of therapy, but is neutral about his/her participation. []      Patient is unable to participate in plan of care at this time.     Thank you for this referral.  Radha Hayward OTR/L  Time Calculation: 27 mins    Eval Complexity: History: MEDIUM Complexity : Expanded review of history including physical, cognitive and psychosocial  history ;   Examination: LOW Complexity : 1-3 performance deficits relating to physical, cognitive , or psychosocial skils that result in activity limitations and / or participation restrictions ;    Decision Making:LOW Complexity : No comorbidities that affect functional and no verbal or physical assistance needed to complete eval tasks

## 2020-07-09 NOTE — PERIOP NOTES
Reviewed PTA medication list with patient/caregiver and patient/caregiver denies any additional medications. Patient admits to having a responsible adult care for them at home for at least 24 hours after surgery. Patient encouraged to use gown warming system and informed that using said warming gown to regulate body temperature prior to a procedure has been shown to help reduce the risks of blood clots and infection. Dual skin assessment & fall risk band verification completed with Prasanth Jay RN.

## 2020-07-09 NOTE — DISCHARGE SUMMARY
[address] [id number]     DISCHARGE SUMMARY     PATIENT: Rimma Chapa     MRN: [id number]   ADMIT DATE: 2020   [facility]   DISCHARGE DATE:      ATTENDING: La Thomas MD   DICTATING: SEBASTIAN Pierson         ADMISSION DIAGNOSIS: LEFT KNEE OSTEOARTHRITIS    DISCHARGE DIAGNOSIS: Status post LEFT TOTAL KNEE ARTHROPLASTY    HISTORY OF PRESENT ILLNESS: The patient is a 72y.o. year-old male   with ongoing left knee pain secondary to osteoarthritis of his left knee. The patient's pain has persisted and progressed despite conservative treatments and therapies. The patient has at this time opted for surgical intervention. PAST MEDICAL HISTORY:   Past Medical History:   Diagnosis Date    Arthritis     Autoimmune disease ([facility] [address]     rheumatoid arthritis    Osteoarthritis of left knee 2020       PAST SURGICAL HISTORY:   Past Surgical History:   Procedure Laterality Date    HX ORTHOPAEDIC  2019    right carpal tunnel release     HX ROTATOR CUFF REPAIR  2014    left       ALLERGIES:   Allergies   Allergen Reactions    Sulfa (Sulfonamide Antibiotics) Diarrhea        CURRENT MEDICATIONS:  A list of medications prior to the time of admission include:  Prior to Admission medications    Medication Sig Start Date End Date Taking? Authorizing Provider   aspirin delayed-release 81 mg tablet Take 1 Tab by mouth two (2) times a day for 21 days. 20 Yes Billie Solano PA   meloxicam (Mobic) 7.5 mg tablet Take 1 Tab by mouth two (2) times a day for 14 days. 20 Yes Billie Solano PA   oxyCODONE-acetaminophen (PERCOCET) 5-325 mg per tablet Take 1 Tab by mouth every four (4) hours as needed for Pain for up to 7 days. Max Daily Amount: 6 Tabs. 20 Yes Billie Solano PA   cefadroxil (DURICEF) 500 mg capsule Take 1 Cap by mouth two (2) times a day for 5 days.  20 Yes Yoshi Solano PA cholecalciferol (Vitamin D3) 25 mcg (1,000 unit) cap Take 1,000 Units by mouth daily. Yes Provider, Historical   tofacitinib Anum [place]) 5 mg tab Take 5 mg by mouth two (2) times a day. Indications: rheumatoid arthritis   Yes Provider, Historical   folic acid (FOLVITE) 1 mg tablet Take 1 mg by mouth daily. Yes Provider, Historical   methotrexate (RHEUMATREX) 2.5 mg tablet Take 15 mg by mouth every Monday. Provider, Historical       FAMILY HISTORY: History reviewed. No pertinent family history. SOCIAL HISTORY:   Social History     Socioeconomic History    Marital status:      Spouse name: Not on file    Number of children: Not on file    Years of education: Not on file    Highest education level: Not on file   Tobacco Use    Smoking status: Former Smoker    Smokeless tobacco: Never Used   Substance and Sexual Activity    Alcohol use: Yes     Alcohol/week: 4.0 standard drinks     Types: 4 Cans of beer per week    Drug use: Never       REVIEW OF SYSTEMS: All review of systems are negative. PHYSICAL EXAMINATION: For a detailed physical exam, please refer to the patient's chart. HOSPITAL COURSE: The patient was taken to surgery the day of admission. he underwent a left total knee replacement. Operative course was benign. Estimated blood loss was approximately 150 cc. The patient was taken to the PACU in stable condition and was later taken to the floor in stable condition. During his hospital stay, the patient progressed well with physical therapy and occupational therapy, adherent to instructions. he had been cleared by physical therapy with stair training. he was placed on Aspirin for DVT prophylaxis. his pain has been well controlled with oral pain medications. his vitals have remained stable. he has also remained hemodynamically stable. The patient has been recommended for discharge home.      DISCHARGE INSTRUCTIONS: The patient is to be discharged home with the following medication changes:     Current Discharge Medication List      START taking these medications    Details   aspirin delayed-release 81 mg tablet Take 1 Tab by mouth two (2) times a day for 21 days. Qty: 42 Tab, Refills: 0      meloxicam (Mobic) 7.5 mg tablet Take 1 Tab by mouth two (2) times a day for 14 days. Qty: 28 Tab, Refills: 0      oxyCODONE-acetaminophen (PERCOCET) 5-325 mg per tablet Take 1 Tab by mouth every four (4) hours as needed for Pain for up to 7 days. Max Daily Amount: 6 Tabs. Qty: 42 Tab, Refills: 0    Associated Diagnoses: Primary osteoarthritis of left knee      cefadroxil (DURICEF) 500 mg capsule Take 1 Cap by mouth two (2) times a day for 5 days. Qty: 10 Cap, Refills: 0         CONTINUE these medications which have NOT CHANGED    Details   cholecalciferol (Vitamin D3) 25 mcg (1,000 unit) cap Take 1,000 Units by mouth daily. folic acid (FOLVITE) 1 mg tablet Take 1 mg by mouth daily. STOP taking these medications       tofacitinib Anum [place]) 5 mg tab Comments:   Reason for Stopping:         methotrexate (RHEUMATREX) 2.5 mg tablet Comments:   Reason for Stopping:                 The patient is to continue at home with home physical therapy 3 times a week to work on gait training, range of motion, strengthening, and weightbearing exercises as tolerated on the left lower extremity. The patient is to progress from a walker to a cane to complete total weightbearing as tolerable. The patient is to continue to keep his incision dry. The patient is to followup with Dr. Gareth Miramontes, Shaun Gotti PA-C and/or Darling Cavazos PA-C in the office approximately 10-14 days status post for x-rays and further evaluation.     SEBASTIAN Pulido  7/9/2020

## 2020-07-09 NOTE — PERIOP NOTES
Visit Vitals  /75   Pulse 61   Temp 97.8 °F (36.6 °C)   Resp 16   Ht 5' 9\" (1.753 m)   Wt 76.7 kg (169 lb 1 oz)   SpO2 100%   BMI 24.97 kg/m²     Patient accepted by Marine Rubio RN, dual skin check caffeine

## 2020-07-09 NOTE — PERIOP NOTES
Intake/Output Summary (Last 24 hours) at 7/9/2020 1434  Last data filed at 7/9/2020 1332  Gross per 24 hour   Intake 1100 ml   Output 150 ml   Net 950 ml     TRANSFER - OUT REPORT:    Verbal report given to Dora(name) on Aruna Thorpe  being transferred to [facility](unit) for routine progression of care       Report consisted of patients Situation, Background, Assessment and   Recommendations(SBAR). Information from the following report(s) Procedure Summary, Intake/Output and MAR was reviewed with the receiving nurse. Lines:   Peripheral IV 07/09/20 Left; Outer Forearm (Active)   Site Assessment Clean, dry, & intact 07/09/20 1332   Phlebitis Assessment 0 07/09/20 1332   Infiltration Assessment 0 07/09/20 1332   Dressing Status Clean, dry, & intact 07/09/20 1332   Dressing Type Tape;Transparent 07/09/20 1332   Hub Color/Line Status Infusing 07/09/20 1332   Alcohol Cap Used No 07/09/20 1033        Opportunity for questions and clarification was provided.       Patient transported with:   O2 @ 2 liters Also reviewed history as recorded in EMR and heart rate course today including interventions and response

## 2020-07-09 NOTE — ANESTHESIA PROCEDURE NOTES
Spinal Block    Start time: 7/9/2020 11:47 AM  End time: 7/9/2020 11:52 AM  Performed by: Usha Cali MD  Authorized by: Usha Cali MD     Pre-procedure: Indications: primary anesthetic  Preanesthetic Checklist: risks and benefits discussed, site marked and timeout performed      Spinal Block:   Patient Position:  Seated  Prep Region:  Lumbar  Prep: chlorhexidine      Location:  L4-5          Needle:   Needle Type:  Pencil-tip  Needle Gauge:  25 G  Attempts:  1      Events: CSF confirmed, no blood with aspiration and no paresthesia        Assessment:  Insertion:  Uncomplicated    Spinal Placement    Patient placed in sitting position, back prepped and draped. Lidocaine infiltrated,   Needle placed. Spinal fluid obtained.  Crystal clear  Inject    mepiv 1.55 + fent  Good flow before, @ 1, 2, 3 ml but not good at 4th ml

## 2020-07-09 NOTE — PROGRESS NOTES
Problem: Mobility Impaired (Adult and Pediatric)  Goal: *Acute Goals and Plan of Care (Insert Text)  Description: Goals to be addressed in 1-4 days:  STG  1. Rolling L to R to L modified independent for positioning. 2. Supine to sit to supine S with HR for meals. 3. Sit to stand to sit S with RW in prep for ambulation. LTG  1. Ambulate >150ft S with RW, WBAT, for home/community mobility. 2. Ascend/descend a >2 stair steps CGA without HR for home entry. 3. Tolerate up in chair 1-2 hours for ADL's.  4. Patient/family to be independent with HEP for follow-up care and safe discharge. Note:   PHYSICAL THERAPY EVALUATION    Patient: Rusty Carbajal (54 y.o. male)  Date: 7/9/2020  Primary Diagnosis: Osteoarthritis of left knee [M17.12]  Procedure(s) (LRB):  LEFT TOTAL KNEE REPLACEMENT (Left) Day of Surgery   Precautions:   Fall, WBAT    ASSESSMENT :  Based on the objective data described below, the patient presents with lower extremity weakness, decreased gait quality and endurance, impaired bed mobility and transfers, decreased L knee ROM/flexibility, and overall limitations in functional mobility s/p L TKR. Pt performed supine to sit with SBA, sit to stand with SBA. Patient ambulated 150 feet with RW, GB applied, SBA/CGA. Pt negotiated 5 stairs holding B handrails. Pt also performed backward step method to ascend the two steps he has to enter home. Pt tolerated session well as evidenced by well. No c/o lightheadedness or dizziness. Pt has been educated on seated and supine therex to be performed 3x/day at home to improve strength and ROM. Pt educated on positioning, home safety, activity pacing, and icing. Home health physical therapy is recommended upon discharge from hospital.     Patient will benefit from skilled intervention to address the above impairments.   Patients rehabilitation potential is considered to be Good  Factors which may influence rehabilitation potential include:   []         None noted  [] Mental ability/status  []         Medical condition  []         Home/family situation and support systems  []         Safety awareness  [x]         Pain tolerance/management  []         Other:      PLAN :  Recommendations and Planned Interventions:  [x]           Bed Mobility Training             []    Neuromuscular Re-Education  [x]           Transfer Training                   []    Orthotic/Prosthetic Training  [x]           Gait Training                          []    Modalities  [x]           Therapeutic Exercises          []    Edema Management/Control  [x]           Therapeutic Activities            [x]    Patient and Family Training/Education  []           Other (comment):    Frequency/Duration: Patient will be followed by physical therapy twice daily to address goals. Discharge Recommendations: Home Health  Further Equipment Recommendations for Discharge: N/A     SUBJECTIVE:   Patient stated I am doing good, I feel good.     OBJECTIVE DATA SUMMARY:     Past Medical History:   Diagnosis Date    Arthritis     Autoimmune disease ([facility] [address]     rheumatoid arthritis    Osteoarthritis of left knee 7/8/2020     Past Surgical History:   Procedure Laterality Date    HX ORTHOPAEDIC  2019    right carpal tunnel release     HX ROTATOR CUFF REPAIR  2014    left     Barriers to Learning/Limitations: None  Compensate with: Visual Cues, Verbal Cues, and Tactile Cues  Prior Level of Function/Home Situation: Independent amb s/AD  Home Situation  Home Environment: Private residence  # Steps to Enter: 2  Rails to Enter: No  One/Two Story Residence: One story  Living Alone: No  Support Systems: Spouse/Significant Other/Partner  Patient Expects to be Discharged to[de-identified] Private residence  Current DME Used/Available at Home: Walker, rolling  Critical Behavior:  Neurologic State: Appropriate for age; Alert  Psychosocial  Purposeful Interaction: Yes  Pt Identified Daily Priority: Clinical issues (comment)  Caritas Process: Establish trust;Nurture loving kindness  Skin Condition/Temp: Dry;Warm   Skin Integrity: Incision (comment)  Skin Integumentary  Skin Color: Appropriate for ethnicity  Skin Condition/Temp: Dry;Warm  Skin Integrity: Incision (comment)  Turgor: Non-tenting   Strength:    Strength: Generally decreased, functional  Tone & Sensation:   Tone: Normal  Sensation: Impaired  Range Of Motion:  AROM: Generally decreased, functional  PROM: Generally decreased, functional  Functional Mobility:  Bed Mobility:   Supine to Sit: Stand-by assistance  Sit to Supine: Stand-by assistance   Transfers:  Sit to Stand: Stand-by assistance  Stand to Sit: Stand-by assistance  Balance:   Sitting: Intact  Standing: Intact; With support  Ambulation/Gait Training:  Distance (ft): 150 Feet (ft)  Assistive Device: Gait belt;Walker, rolling  Ambulation - Level of Assistance: Contact guard assistance   Gait Description (WDL): Exceptions to WDL  Gait Abnormalities: Decreased step clearance; Antalgic; Step to gait  Right Side Weight Bearing: As tolerated   Base of Support: Shift to right  Stance: Right decreased; Left increased  Speed/Alondra: Slow  Step Length: Right shortened;Left shortened  Stairs:  Number of Stairs Trained: 5  Stairs - Level of Assistance: Contact guard assistance;Minimum assistance  Rail Use: Both  Pain:  Pain Scale 1: Numeric (0 - 10)  Pain Intensity 1: 0  Activity Tolerance:   Good  Please refer to the flowsheet for vital signs taken during this treatment. After treatment:   []         Patient left in no apparent distress sitting up in chair  [x]         Patient left in no apparent distress in bed  [x]         Call bell left within reach  [x]         Nursing notified  []         Caregiver present  []         Bed alarm activated    COMMUNICATION/EDUCATION:   [x]         Fall prevention education was provided and the patient/caregiver indicated understanding.   [x]         Patient/family have participated as able in goal setting and plan of care. [x]         Patient/family agree to work toward stated goals and plan of care. []         Patient understands intent and goals of therapy, but is neutral about his/her participation. []         Patient is unable to participate in goal setting and plan of care.     Thank you for this referral.  Breanne Webber   Time Calculation: 26 mins   Eval Complexity: History: MEDIUM  Complexity : 1-2 comorbidities / personal factors will impact the outcome/ POC Exam:LOW Complexity : 1-2 Standardized tests and measures addressing body structure, function, activity limitation and / or participation in recreation  Presentation: LOW Complexity : Stable, uncomplicated  Clinical Decision Making:Low Complexity    Overall Complexity:LOW

## 2020-07-09 NOTE — OP NOTES
[address] [facility] [address]  Total Knee Arthroplasty    Patient: Ania Prescott MRN: [id number]  SSN: [id number]    YOB: 1955  Age: 72 y.o. Sex: male      Date of Surgery: 7/9/2020   Preoperative Diagnosis: LEFT KNEE OSTEOARTHRITIS   Postoperative Diagnosis: LEFT KNEE OSTEOARTHRITIS   Location: [facility]  Surgeon: Pan Ugarte MD  Assistant: Brendan Feldman PA-C    Anesthesia: Spinal and adductor canal Nerve Block    Procedure: Total Knee Arthroplasty:   The complexity of the total joint surgery requires the use of a first assistant for positioning, retraction and assistance in closure. Tourniquet Time: Tourniquet not used. Estimated Blood Loss: Less than 100cc     Implants:   Implant Name Type Inv. Item Serial No.  Lot No. LRB No. Used Action   CEMENT BNE SMARTSET HV 40GM -- ORDER IN SETS OF 20 - [id number]  CEMENT BNE SMARTSET HV 40GM -- ORDER IN SETS OF 20  [employer] [id number] Left 2 Implanted   PATELLA 32MM -- BKS E-VITALIZE - [id number]  PATELLA 32MM -- BKS E-VITALIZE  ORTHO DEVELOPMENT CHERYL [id number] Left 1 Implanted   IMPL CR FEM NP SZ6 LT -- BKS TRIMAX - [id number]  IMPL CR FEM NP SZ6 LT -- BKS TRIMAX  ORTHO DEVELOPMENT CHERYL [id number] Left 1 Implanted   TRAY TIB NP SZ6 -- BALANCED KNEE SYSTEM - [id number]  TRAY TIB NP SZ6 -- BALANCED KNEE SYSTEM  ORTHO DEVELOPMENT CHERYL [id number] Left 1 Implanted   INSERT TIB CR SZ6 9MM -- BKS E-VITALIZE - [id number]  INSERT TIB CR SZ6 9MM -- BKS E-VITALIZE  ORTHO DEVELOPMENT CHERYL [id number] Left 1 Implanted        Specimens: None    Additional Findings: None     Complications: none    Body Mass Index: Body mass index is 24.97 kg/m². Procedure Detail:  Prior to the surgery the patient was administered a femoral nerve block in the preoperative holding area by the anesthesiologist. Ania Prescott was brought to the operating room and positioned on the operating table.  He was anesthetized with anesthesia. Intravenous antibiotics were administered. Prior to the incision being made a timeout was called identifying the patient, procedure, operative side, and surgeon. A pneumatic tourniquet was placed about the limb and the left leg was prepped and draped in the usual sterile manner. The tourniquet was not inflated throughout the case. A midline anterior incision made over the knee. The incision was carried down through the subcutaneous tissue to the underlying capsule. A subvastus capsular incision was performed. The medial capsular flap was carefully elevated around to the posterior medial corner protecting the medial collateral ligaments and the fibers. The patella was sized with a caliper, and approximately 10-12 mm was resected with an oscillating saw allowing the patella to be slid into the lateral gutter. It was not everted throughout the case. Our attention was first turned to the distal femur and using intermedullary instrumentation, a 5-degree valgus cut was on the distal end of the femur. The distal end of the femur was sized to a size 6 femoral component. Pins were inserted through the sizer and the corresponding 4-in-1 block was slid into place and pinned for stability. Anterior and posterior and chamfer cuts were made to accommodate the femoral component. The medial and lateral menisci were excised as were the anterior cruciate ligaments. Our attention was then turned to the tibia. Using extramedullary instrumentation, a 3-degree cut was made on the proximal end of the tibia. A spacer block was placed to show gaps had been balanced and a size 6  tibial base plate was placed on the tibia and pinned into place. Intramedullary reaming guide was placed on the tibia and the appropriate reamer was used followed by the keel punch to complete the preparation of the tibia. A trial femoral component was then impacted on to the distal end of the femur.  Trial reduction was then performed with incremental size trial bearing surfaces. The orthodevelopment BKS trimax CR 9mm bearing surface was inserted and allowed for full extension, good medial, lateral stability at 90 degrees of flexion, especially medially. Our attention was then turned to the patella. The patella was sized to a 32mm patella. The guide was pinned, placed over patella, 3 holes were drilled. Trial patella button was inserted and the patella was reduced in the knee. The patella tracked normally using no-touch technique. The trial components were then all removed. The real components were opened on the back. The cut surfaces of the bone were prepared using the PulsaVac lavage. Prior to this, the Aquamantys was used to cauterize any soft tissue bleeding. 40 grams of Image Insightuy HV cement was mixed. The femoral and tibial components  and patellar component was cemented into place. Excess cement was removed from around the edge of bone using plastic curette. Once the cement had hardened, the knee was placed through range of motion and noted to be stable as mentioned above with the trail components. The wound was dry, therefore no drain was used. The operative knee was injected with 20 cc of exparel. The knee was then soaked with a diluted betadine solution for approximately 3 min. This was then thoroughly irrigated. The capsular layer was closed using a #2 stratafix suture with the knee flexed 90 degrees, while subcutaneous layers were closed using 2-0 Vicryl suture. Finally the skin was closed using Prineo, which were applied in occlusive fashion and sterile bandage applied. All sponge and needle counts were correct. He was taken to the recovery room extubated in stable condition.     Signed By: Iris Bañuelos MD     July 9, 2020

## 2020-07-09 NOTE — INTERVAL H&P NOTE
Update History & Physical 
 
The Patient's History and Physical of July 8,2020,  
  was reviewed with the patient and I examined the patient. There was no change. The surgical site was confirmed by the patient and me. Plan:  The risk, benefits, expected outcome, and alternative to the recommended procedure have been discussed with the patient. Patient understands and wants to proceed with the procedure.  
 
Electronically signed by Landry De La Vega MD on 7/9/2020 at 10:15 AM

## 2020-07-09 NOTE — PERIOP NOTES
2nd page to Dr William Garces to briefed on heart rate to 39 and 39, with BP WNL, remains alert and drowsy.  Orders received

## 2020-07-09 NOTE — PROGRESS NOTES
Transition of Care (ISAIAS) Plan:     Chart reviewed, met with pt in room. Introduced CM and role to pt, verified discharge address via face sheet. FOC offered, pt chose [facility] [phone] for follow up; referral placed. Pt has RW for home, wife will pick him up at discharge and is available to assist once home. ISAIAS Transportation:   How is patient being transported at discharge? Family/Friend      When? Once cleared by Therapy      Is transport scheduled? N/A      Follow-up appointment and transportation:   PCP/Specialist?  See AVS for Appointment         Who is transporting to the follow-up appointment? Family/Friend      Is transport for follow up appointment scheduled? N/A    Communication plan (with patient/family): Who is being called? Patient or Next of Kin? Responsible party? Patient      What number(s) is to be used? See Facesheet      What service provider is calling for [facility] services? When are they calling? 24-48 hours following discharge    Readmission Risk? (Green/Low; Yellow/Moderate; Red/High):  Green    Care Management Interventions  PCP Verified by CM:  Yes  Transition of Care Consult (CM Consult): [address]: Yes  Discharge Durable Medical Equipment: No  Physical Therapy Consult: Yes  Occupational Therapy Consult: Yes  Current Support Network: Lives with Spouse  Confirm Follow Up Transport: Family  The Plan for Transition of Care is Related to the Following Treatment Goals : HH  The Patient and/or Patient Representative was Provided with a Choice of Provider and Agrees with the Discharge Plan?: Yes  Freedom of Choice List was Provided with Basic Dialogue that Supports the Patient's Individualized Plan of Care/Goals, Treatment Preferences and Shares the Quality Data Associated with the Providers?: Yes  Discharge Location  Discharge Placement: Home with home health

## 2020-07-09 NOTE — ANESTHESIA POSTPROCEDURE EVALUATION
Procedure(s):  LEFT TOTAL KNEE REPLACEMENT.    regional, spinal    Anesthesia Post Evaluation        Comments: Post-Anesthesia Evaluation and Assessment    Cardiovascular Function/Vital Signs  /86   Pulse (!) 54   Temp 36.6 °C (97.8 °F)   Resp 15   Ht 5' 9\" (1.753 m)   Wt 76.7 kg (169 lb 1 oz)   SpO2 100%   BMI 24.97 kg/m²     Patient is status post Procedure(s):  LEFT TOTAL KNEE REPLACEMENT. Nausea/Vomiting: Controlled. Postoperative hydration reviewed and adequate. Pain:  Pain Scale 1: Numeric (0 - 10) (07/09/20 1425)  Pain Intensity 1: 0 (07/09/20 1425)   Managed. Neurological Status:   Neuro (WDL): Within Defined Limits (07/09/20 1315)   At baseline. Mental Status and Level of Consciousness: Arousable. Pulmonary Status:   O2 Device: Room air (07/09/20 1425)   Adequate oxygenation and airway patent. Complications related to anesthesia: None    Post-anesthesia assessment completed. No concerns. Patient has met all discharge requirements.     Signed By: Millie Hernandez MD    July 9, 2020                     INITIAL Post-op Vital signs:   Vitals Value Taken Time   /75 7/9/2020  2:49 PM   Temp 36.6 °C (97.8 °F) 7/9/2020  2:49 PM   Pulse 61 7/9/2020  2:49 PM   Resp 16 7/9/2020  2:49 PM   SpO2 100 % 7/9/2020  2:49 PM

## 2020-07-10 ENCOUNTER — HOME CARE VISIT (OUTPATIENT)
Dept: SCHEDULING | Facility: HOME HEALTH | Age: 65
End: 2020-07-10
Payer: COMMERCIAL

## 2020-07-10 VITALS
DIASTOLIC BLOOD PRESSURE: 78 MMHG | HEART RATE: 64 BPM | TEMPERATURE: 100.4 F | SYSTOLIC BLOOD PRESSURE: 148 MMHG | RESPIRATION RATE: 16 BRPM | OXYGEN SATURATION: 98 %

## 2020-07-10 PROCEDURE — 3331090002 HH PPS REVENUE DEBIT

## 2020-07-10 PROCEDURE — 3331090001 HH PPS REVENUE CREDIT

## 2020-07-10 PROCEDURE — 400013 HH SOC

## 2020-07-10 PROCEDURE — G0151 HHCP-SERV OF PT,EA 15 MIN: HCPCS

## 2020-07-11 ENCOUNTER — HOME CARE VISIT (OUTPATIENT)
Dept: SCHEDULING | Facility: HOME HEALTH | Age: 65
End: 2020-07-11
Payer: COMMERCIAL

## 2020-07-11 ENCOUNTER — HOSPITAL ENCOUNTER (INPATIENT)
Age: 65
LOS: 2 days | Discharge: HOME HEALTH | DRG: 855 | End: 2020-07-13
Attending: EMERGENCY MEDICINE | Admitting: FAMILY MEDICINE
Payer: COMMERCIAL

## 2020-07-11 ENCOUNTER — APPOINTMENT (OUTPATIENT)
Dept: VASCULAR SURGERY | Age: 65
DRG: 855 | End: 2020-07-11
Attending: PHYSICIAN ASSISTANT
Payer: COMMERCIAL

## 2020-07-11 ENCOUNTER — HOME CARE VISIT (OUTPATIENT)
Dept: HOME HEALTH SERVICES | Facility: HOME HEALTH | Age: 65
End: 2020-07-11
Payer: COMMERCIAL

## 2020-07-11 ENCOUNTER — APPOINTMENT (OUTPATIENT)
Dept: CT IMAGING | Age: 65
DRG: 855 | End: 2020-07-11
Attending: PHYSICIAN ASSISTANT
Payer: COMMERCIAL

## 2020-07-11 ENCOUNTER — APPOINTMENT (OUTPATIENT)
Dept: GENERAL RADIOLOGY | Age: 65
DRG: 855 | End: 2020-07-11
Attending: PHYSICIAN ASSISTANT
Payer: COMMERCIAL

## 2020-07-11 ENCOUNTER — APPOINTMENT (OUTPATIENT)
Dept: MRI IMAGING | Age: 65
DRG: 855 | End: 2020-07-11
Attending: PHYSICIAN ASSISTANT
Payer: COMMERCIAL

## 2020-07-11 DIAGNOSIS — Z96.652 STATUS POST TOTAL LEFT KNEE REPLACEMENT: ICD-10-CM

## 2020-07-11 DIAGNOSIS — R50.82 POST-PROCEDURAL FEVER: Primary | ICD-10-CM

## 2020-07-11 PROBLEM — M06.9 RA (RHEUMATOID ARTHRITIS) (HCC): Status: ACTIVE | Noted: 2020-07-11

## 2020-07-11 PROBLEM — R50.9 FEVER: Status: ACTIVE | Noted: 2020-07-11

## 2020-07-11 LAB
ALBUMIN SERPL-MCNC: 3.2 G/DL (ref 3.4–5)
ALBUMIN/GLOB SERPL: 1.2 {RATIO} (ref 0.8–1.7)
ALP SERPL-CCNC: 38 U/L (ref 45–117)
ALT SERPL-CCNC: 25 U/L (ref 16–61)
ANION GAP SERPL CALC-SCNC: 4 MMOL/L (ref 3–18)
APPEARANCE UR: CLEAR
APTT PPP: 27.6 SEC (ref 23–36.4)
AST SERPL-CCNC: 35 U/L (ref 10–38)
BASOPHILS # BLD: 0 K/UL (ref 0–0.1)
BASOPHILS NFR BLD: 0 % (ref 0–2)
BILIRUB SERPL-MCNC: 0.9 MG/DL (ref 0.2–1)
BILIRUB UR QL: NEGATIVE
BUN SERPL-MCNC: 8 MG/DL (ref 7–18)
BUN/CREAT SERPL: 9 (ref 12–20)
CALCIUM SERPL-MCNC: 8.2 MG/DL (ref 8.5–10.1)
CHLORIDE SERPL-SCNC: 105 MMOL/L (ref 100–111)
CO2 SERPL-SCNC: 29 MMOL/L (ref 21–32)
COLOR UR: YELLOW
CREAT SERPL-MCNC: 0.88 MG/DL (ref 0.6–1.3)
DIFFERENTIAL METHOD BLD: ABNORMAL
EOSINOPHIL # BLD: 0.1 K/UL (ref 0–0.4)
EOSINOPHIL NFR BLD: 1 % (ref 0–5)
ERYTHROCYTE [DISTWIDTH] IN BLOOD BY AUTOMATED COUNT: 13.1 % (ref 11.6–14.5)
GLOBULIN SER CALC-MCNC: 2.6 G/DL (ref 2–4)
GLUCOSE SERPL-MCNC: 110 MG/DL (ref 74–99)
GLUCOSE UR STRIP.AUTO-MCNC: NEGATIVE MG/DL
HCT VFR BLD AUTO: 32.2 % (ref 36–48)
HGB BLD-MCNC: 10.2 G/DL (ref 13–16)
HGB UR QL STRIP: NEGATIVE
INR PPP: 1 (ref 0.8–1.2)
KETONES UR QL STRIP.AUTO: NEGATIVE MG/DL
LACTATE SERPL-SCNC: 1.1 MMOL/L (ref 0.4–2)
LEUKOCYTE ESTERASE UR QL STRIP.AUTO: NEGATIVE
LYMPHOCYTES # BLD: 0.4 K/UL (ref 0.9–3.6)
LYMPHOCYTES NFR BLD: 7 % (ref 21–52)
MCH RBC QN AUTO: 32.9 PG (ref 24–34)
MCHC RBC AUTO-ENTMCNC: 31.7 G/DL (ref 31–37)
MCV RBC AUTO: 103.9 FL (ref 74–97)
MONOCYTES # BLD: 1 K/UL (ref 0.05–1.2)
MONOCYTES NFR BLD: 17 % (ref 3–10)
NEUTS SEG # BLD: 4.5 K/UL (ref 1.8–8)
NEUTS SEG NFR BLD: 75 % (ref 40–73)
NITRITE UR QL STRIP.AUTO: NEGATIVE
PH UR STRIP: 8 [PH] (ref 5–8)
PLATELET # BLD AUTO: 207 K/UL (ref 135–420)
PMV BLD AUTO: 9.7 FL (ref 9.2–11.8)
POTASSIUM SERPL-SCNC: 3.8 MMOL/L (ref 3.5–5.5)
PROT SERPL-MCNC: 5.8 G/DL (ref 6.4–8.2)
PROT UR STRIP-MCNC: NEGATIVE MG/DL
PROTHROMBIN TIME: 13.4 SEC (ref 11.5–15.2)
RBC # BLD AUTO: 3.1 M/UL (ref 4.7–5.5)
RBC MORPH BLD: ABNORMAL
SODIUM SERPL-SCNC: 138 MMOL/L (ref 136–145)
SP GR UR REFRACTOMETRY: 1.01 (ref 1–1.03)
UROBILINOGEN UR QL STRIP.AUTO: 1 EU/DL (ref 0.2–1)
WBC # BLD AUTO: 6 K/UL (ref 4.6–13.2)

## 2020-07-11 PROCEDURE — 96375 TX/PRO/DX INJ NEW DRUG ADDON: CPT

## 2020-07-11 PROCEDURE — 87040 BLOOD CULTURE FOR BACTERIA: CPT

## 2020-07-11 PROCEDURE — 85610 PROTHROMBIN TIME: CPT

## 2020-07-11 PROCEDURE — 74011250637 HC RX REV CODE- 250/637: Performed by: PHYSICIAN ASSISTANT

## 2020-07-11 PROCEDURE — 73721 MRI JNT OF LWR EXTRE W/O DYE: CPT

## 2020-07-11 PROCEDURE — 87635 SARS-COV-2 COVID-19 AMP PRB: CPT

## 2020-07-11 PROCEDURE — G0299 HHS/HOSPICE OF RN EA 15 MIN: HCPCS

## 2020-07-11 PROCEDURE — 96374 THER/PROPH/DIAG INJ IV PUSH: CPT

## 2020-07-11 PROCEDURE — 71045 X-RAY EXAM CHEST 1 VIEW: CPT

## 2020-07-11 PROCEDURE — 74011000250 HC RX REV CODE- 250: Performed by: PHYSICIAN ASSISTANT

## 2020-07-11 PROCEDURE — 3331090001 HH PPS REVENUE CREDIT

## 2020-07-11 PROCEDURE — 80053 COMPREHEN METABOLIC PANEL: CPT

## 2020-07-11 PROCEDURE — 93971 EXTREMITY STUDY: CPT

## 2020-07-11 PROCEDURE — 99285 EMERGENCY DEPT VISIT HI MDM: CPT

## 2020-07-11 PROCEDURE — 65660000000 HC RM CCU STEPDOWN

## 2020-07-11 PROCEDURE — 71275 CT ANGIOGRAPHY CHEST: CPT

## 2020-07-11 PROCEDURE — 74011250636 HC RX REV CODE- 250/636: Performed by: PHYSICIAN ASSISTANT

## 2020-07-11 PROCEDURE — 74011250637 HC RX REV CODE- 250/637: Performed by: FAMILY MEDICINE

## 2020-07-11 PROCEDURE — 81003 URINALYSIS AUTO W/O SCOPE: CPT

## 2020-07-11 PROCEDURE — 85730 THROMBOPLASTIN TIME PARTIAL: CPT

## 2020-07-11 PROCEDURE — 74011636320 HC RX REV CODE- 636/320: Performed by: EMERGENCY MEDICINE

## 2020-07-11 PROCEDURE — 85025 COMPLETE CBC W/AUTO DIFF WBC: CPT

## 2020-07-11 PROCEDURE — 3331090002 HH PPS REVENUE DEBIT

## 2020-07-11 PROCEDURE — 83605 ASSAY OF LACTIC ACID: CPT

## 2020-07-11 RX ORDER — PROMETHAZINE HYDROCHLORIDE 25 MG/1
12.5 TABLET ORAL
Status: DISCONTINUED | OUTPATIENT
Start: 2020-07-11 | End: 2020-07-13 | Stop reason: HOSPADM

## 2020-07-11 RX ORDER — FAMOTIDINE 20 MG/1
20 TABLET, FILM COATED ORAL 2 TIMES DAILY
Status: DISCONTINUED | OUTPATIENT
Start: 2020-07-12 | End: 2020-07-13 | Stop reason: HOSPADM

## 2020-07-11 RX ORDER — POLYETHYLENE GLYCOL 3350 17 G/17G
17 POWDER, FOR SOLUTION ORAL DAILY PRN
Status: DISCONTINUED | OUTPATIENT
Start: 2020-07-11 | End: 2020-07-13 | Stop reason: HOSPADM

## 2020-07-11 RX ORDER — ZINC SULFATE 50(220)MG
1 CAPSULE ORAL DAILY
Status: DISCONTINUED | OUTPATIENT
Start: 2020-07-12 | End: 2020-07-13 | Stop reason: HOSPADM

## 2020-07-11 RX ORDER — CHOLECALCIFEROL (VITAMIN D3) 125 MCG
5 CAPSULE ORAL
Status: DISCONTINUED | OUTPATIENT
Start: 2020-07-11 | End: 2020-07-13 | Stop reason: HOSPADM

## 2020-07-11 RX ORDER — SODIUM CHLORIDE 0.9 % (FLUSH) 0.9 %
5-40 SYRINGE (ML) INJECTION AS NEEDED
Status: DISCONTINUED | OUTPATIENT
Start: 2020-07-11 | End: 2020-07-13 | Stop reason: HOSPADM

## 2020-07-11 RX ORDER — ASCORBIC ACID 250 MG
500 TABLET ORAL 2 TIMES DAILY
Status: DISCONTINUED | OUTPATIENT
Start: 2020-07-12 | End: 2020-07-13 | Stop reason: HOSPADM

## 2020-07-11 RX ORDER — ACETAMINOPHEN 500 MG
1000 TABLET ORAL
Status: COMPLETED | OUTPATIENT
Start: 2020-07-11 | End: 2020-07-11

## 2020-07-11 RX ORDER — ONDANSETRON 2 MG/ML
4 INJECTION INTRAMUSCULAR; INTRAVENOUS
Status: DISCONTINUED | OUTPATIENT
Start: 2020-07-11 | End: 2020-07-13 | Stop reason: HOSPADM

## 2020-07-11 RX ORDER — MELATONIN
2000 DAILY
Status: DISCONTINUED | OUTPATIENT
Start: 2020-07-12 | End: 2020-07-13 | Stop reason: HOSPADM

## 2020-07-11 RX ORDER — ACETAMINOPHEN 650 MG/1
650 SUPPOSITORY RECTAL
Status: DISCONTINUED | OUTPATIENT
Start: 2020-07-11 | End: 2020-07-13 | Stop reason: HOSPADM

## 2020-07-11 RX ORDER — SODIUM CHLORIDE 0.9 % (FLUSH) 0.9 %
5-40 SYRINGE (ML) INJECTION EVERY 8 HOURS
Status: DISCONTINUED | OUTPATIENT
Start: 2020-07-11 | End: 2020-07-13 | Stop reason: HOSPADM

## 2020-07-11 RX ORDER — ACETAMINOPHEN 325 MG/1
650 TABLET ORAL
Status: DISCONTINUED | OUTPATIENT
Start: 2020-07-11 | End: 2020-07-13 | Stop reason: HOSPADM

## 2020-07-11 RX ADMIN — IOPAMIDOL 100 ML: 755 INJECTION, SOLUTION INTRAVENOUS at 17:49

## 2020-07-11 RX ADMIN — CEFEPIME HYDROCHLORIDE 2 G: 2 INJECTION, POWDER, FOR SOLUTION INTRAVENOUS at 23:39

## 2020-07-11 RX ADMIN — Medication 10 ML: at 23:39

## 2020-07-11 RX ADMIN — VANCOMYCIN HYDROCHLORIDE 1250 MG: 1.25 INJECTION, POWDER, LYOPHILIZED, FOR SOLUTION INTRAVENOUS at 16:19

## 2020-07-11 RX ADMIN — Medication 5 MG: at 23:39

## 2020-07-11 RX ADMIN — CEFEPIME HYDROCHLORIDE 2 G: 2 INJECTION, POWDER, FOR SOLUTION INTRAVENOUS at 16:19

## 2020-07-11 RX ADMIN — ACETAMINOPHEN 1000 MG: 500 TABLET ORAL at 16:24

## 2020-07-11 NOTE — ED NOTES
Pt is lying in bed, aox4 breathing is unlabored, speaks in full sentences. Pt is awaiting to be transported to MRI. Pt's bed is set to lowest position, call light within reach.

## 2020-07-11 NOTE — ED PROVIDER NOTES
EMERGENCY DEPARTMENT HISTORY AND PHYSICAL EXAM 
 
Date: 7/11/2020 Patient Name: Jose Luis Vegas History of Presenting Illness No chief complaint on file. History Provided By: Patient Chief Complaint: fever Additional History (Context):  
3:22 PM 
Jose Luis Vegas is a 72 y.o. male with PMHX total knee replacement,  RA presents to the emergency department C/O fever. Patient had total knee replacement on the left 4 days ago performed by Dr. Roque Tapia. Patient has had a fever for the past 2 days T-max 101.4. Patient was discharged on aspirin, Percocet, cephalosporin. He does have a history of RA and takes methotrexate which he discontinued the day prior to surgery and was instructed to stay off of it for 2 weeks following. Patient denies any cough, chest pain, knee pain, shortness of breath. PCP: Saranya Chan MD 
 
Current Facility-Administered Medications Medication Dose Route Frequency Provider Last Rate Last Dose  cefepime (MAXIPIME) 2 g in sterile water (preservative free) 10 mL IV syringe  2 g IntraVENous Q8H Seven Menjivar PA   2 g at 07/11/20 1619  
 [START ON 7/12/2020] vancomycin (VANCOCIN) 1,250 mg in 0.9% sodium chloride 250 mL (VIAL-MATE)  1,250 mg IntraVENous Q12H Verenice Menjivar PA  Vancomycin Dose to monitor  1 Each Other Rx Dosing/Monitoring [facility] [place] Current Outpatient Medications Medication Sig Dispense Refill  aspirin delayed-release 81 mg tablet Take 1 Tab by mouth two (2) times a day for 21 days. 42 Tab 0  
 meloxicam (Mobic) 7.5 mg tablet Take 1 Tab by mouth two (2) times a day for 14 days. 28 Tab 0  
 oxyCODONE-acetaminophen (PERCOCET) 5-325 mg per tablet Take 1 Tab by mouth every four (4) hours as needed for Pain for up to 7 days. Max Daily Amount: 6 Tabs. 42 Tab 0  
 cefadroxil (DURICEF) 500 mg capsule Take 1 Cap by mouth two (2) times a day for 5 days.  10 Cap 0  
  cholecalciferol (Vitamin D3) 25 mcg (1,000 unit) cap Take 1,000 Units by mouth daily.  folic acid (FOLVITE) 1 mg tablet Take 1 mg by mouth daily. Past History Past Medical History: 
Past Medical History:  
Diagnosis Date  Arthritis  Autoimmune disease (HCC)   
 rheumatoid arthritis  Osteoarthritis of left knee 7/8/2020 Past Surgical History: 
Past Surgical History:  
Procedure Laterality Date  HX ORTHOPAEDIC  2019  
 right carpal tunnel release  HX ROTATOR CUFF REPAIR  2014  
 left Family History: 
History reviewed. No pertinent family history. Social History: 
Social History Tobacco Use  Smoking status: Former Smoker  Smokeless tobacco: Never Used Substance Use Topics  Alcohol use: Yes Alcohol/week: 4.0 standard drinks Types: 4 Cans of beer per week  Drug use: Never Allergies: Allergies Allergen Reactions  Sulfa (Sulfonamide Antibiotics) Diarrhea Review of Systems Review of Systems Constitutional: Positive for fever. Negative for chills. Respiratory: Negative for shortness of breath. Cardiovascular: Negative for chest pain, palpitations and leg swelling. Neurological: Negative for weakness and numbness. All other systems reviewed and are negative. Physical Exam  
 
Vitals:  
 07/11/20 1820 07/11/20 1825 07/11/20 1830 07/11/20 1904 BP: 129/66 140/73 144/77 Pulse: 64 63 69 67 Resp: 16 19 19 14 Temp:      
SpO2: 100% 97% 99% 99% Weight:      
Height:      
 
Physical Exam 
Vitals signs and nursing note reviewed. Constitutional:   
   Appearance: He is well-developed. HENT:  
   Head: Normocephalic and atraumatic. Neck: Musculoskeletal: Normal range of motion and neck supple. Cardiovascular:  
   Rate and Rhythm: Normal rate and regular rhythm. Heart sounds: Normal heart sounds. No murmur. Pulmonary:  
   Effort: Pulmonary effort is normal. No respiratory distress. Breath sounds: Normal breath sounds. No wheezing or rales. Comments: Lungs clear to auscultation bilaterally Abdominal:  
   General: Bowel sounds are normal.  
   Palpations: Abdomen is soft. Tenderness: There is no abdominal tenderness. Musculoskeletal:  
   Comments: Well-healing surgical incision over the left knee. Good range of motion, generalized swelling to the leg with large area of ecchymoses to the medial thigh, pulses 2+ for DP and PT Neurological:  
   Mental Status: He is alert and oriented to person, place, and time. Psychiatric:     
   Judgment: Judgment normal.  
 
 
 
Diagnostic Study Results Labs: 
  
Recent Results (from the past 12 hour(s)) CBC WITH AUTOMATED DIFF Collection Time: 07/11/20  3:00 PM  
Result Value Ref Range WBC 6.0 4.6 - 13.2 K/uL  
 RBC 3.10 (L) 4.70 - 5.50 M/uL  
 HGB 10.2 (L) 13.0 - 16.0 g/dL HCT 32.2 (L) 36.0 - 48.0 % .9 (H) 74.0 - 97.0 FL  
 MCH 32.9 24.0 - 34.0 PG  
 MCHC 31.7 31.0 - 37.0 g/dL  
 RDW 13.1 11.6 - 14.5 % PLATELET 220 446 - 639 K/uL MPV 9.7 9.2 - 11.8 FL  
 NEUTROPHILS 75 (H) 40 - 73 % LYMPHOCYTES 7 (L) 21 - 52 % MONOCYTES 17 (H) 3 - 10 % EOSINOPHILS 1 0 - 5 % BASOPHILS 0 0 - 2 %  
 ABS. NEUTROPHILS 4.5 1.8 - 8.0 K/UL  
 ABS. LYMPHOCYTES 0.4 (L) 0.9 - 3.6 K/UL  
 ABS. MONOCYTES 1.0 0.05 - 1.2 K/UL  
 ABS. EOSINOPHILS 0.1 0.0 - 0.4 K/UL  
 ABS. BASOPHILS 0.0 0.0 - 0.1 K/UL  
 RBC COMMENTS NORMOCYTIC, NORMOCHROMIC    
 DF MANUAL METABOLIC PANEL, COMPREHENSIVE Collection Time: 07/11/20  3:00 PM  
Result Value Ref Range Sodium 138 136 - 145 mmol/L Potassium 3.8 3.5 - 5.5 mmol/L Chloride 105 100 - 111 mmol/L  
 CO2 29 21 - 32 mmol/L Anion gap 4 3.0 - 18 mmol/L Glucose 110 (H) 74 - 99 mg/dL BUN 8 7.0 - 18 MG/DL Creatinine 0.88 0.6 - 1.3 MG/DL  
 BUN/Creatinine ratio 9 (L) 12 - 20 GFR est AA >60 >60 ml/min/1.73m2 GFR est non-AA >60 >60 ml/min/1.73m2 Calcium 8.2 (L) 8.5 - 10.1 MG/DL Bilirubin, total 0.9 0.2 - 1.0 MG/DL  
 ALT (SGPT) 25 16 - 61 U/L  
 AST (SGOT) 35 10 - 38 U/L Alk. phosphatase 38 (L) 45 - 117 U/L Protein, total 5.8 (L) 6.4 - 8.2 g/dL Albumin 3.2 (L) 3.4 - 5.0 g/dL Globulin 2.6 2.0 - 4.0 g/dL A-G Ratio 1.2 0.8 - 1.7 LACTIC ACID Collection Time: 07/11/20  3:00 PM  
Result Value Ref Range Lactic acid 1.1 0.4 - 2.0 MMOL/L  
PROTHROMBIN TIME + INR Collection Time: 07/11/20  3:00 PM  
Result Value Ref Range Prothrombin time 13.4 11.5 - 15.2 sec INR 1.0 0.8 - 1.2 PTT Collection Time: 07/11/20  3:00 PM  
Result Value Ref Range aPTT 27.6 23.0 - 36.4 SEC URINALYSIS W/ RFLX MICROSCOPIC Collection Time: 07/11/20  4:30 PM  
Result Value Ref Range Color YELLOW Appearance CLEAR Specific gravity 1.010 1.005 - 1.030    
 pH (UA) 8.0 5.0 - 8.0 Protein Negative NEG mg/dL Glucose Negative NEG mg/dL Ketone Negative NEG mg/dL Bilirubin Negative NEG Blood Negative NEG Urobilinogen 1.0 0.2 - 1.0 EU/dL Nitrites Negative NEG Leukocyte Esterase Negative NEG    
SARS-COV-2 Collection Time: 07/11/20  5:00 PM  
Result Value Ref Range SARS-CoV-2 PENDING Specimen source Nasopharyngeal    
 
 
Radiologic Studies: CTA CHEST W OR W WO CONT Final Result IMPRESSION:  
  
No evidence of a pulmonary embolism or aortic dissection. Ectasia of the ascending thoracic aorta 3.8 cm. XR CHEST PORT Final Result IMPRESSION:  
  
No acute cardiopulmonary process. MRI KNEE RT WO CONT    (Results Pending) CT Results  (Last 48 hours) 07/11/20 1754  CTA [facility] CONT Final result Impression:  IMPRESSION:  
   
No evidence of a pulmonary embolism or aortic dissection. Ectasia of the ascending thoracic aorta 3.8 cm. Narrative:  EXAM: CTA chest  
   
INDICATION: Fever COMPARISON: None TECHNIQUE: Axial CT imaging from the thoracic inlet through the diaphragm with  
intravenous contrast. Coronal and sagittal MIP reformats were generated. One or  
more dose reduction techniques were used on this CT: automated exposure control,  
adjustment of the mAs and/or kVp according to patient size, and iterative  
reconstruction techniques. The specific techniques used on this CT exam have  
been documented in the patient's electronic medical record. Digital Imaging and  
Communications in Medicine (DICOM) format image data are available to  
nonaffiliated external healthcare facilities or entities on a secure, media  
free, reciprocally searchable basis with patient authorization for at least a  
12-month period after this study. _______________ FINDINGS:  
   
EXAM QUALITY: Overall exam quality is satisfactory. Pulmonary arterial  
enhancement is adequate with adequate breath hold and no significant artifact. PULMONARY ARTERIES: No evidence of pulmonary embolism. LYMPH Nodes: No enlarged lymph nodes seen. PLEURA: No pleural effusion seen. HEART: Normal in size without pericardial effusion. VASCULATURE/MEDIASTINUM: There is ectasia of the ascending thoracic aorta 3.8  
cm. LUNGS: No suspicious nodule or mass. No abnormal opacities. AIRWAY: Patent UPPER ABDOMEN: Unremarkable. OTHER: No acute or aggressive osseous abnormalities identified. _______________ CXR Results  (Last 48 hours) 07/11/20 1548  XR CHEST PORT Final result Impression:  IMPRESSION:  
   
No acute cardiopulmonary process. Narrative:  EXAM: One view chest x-ray CLINICAL INDICATION/HISTORY: Postoperative fever. COMPARISON: No prior relevant study available for comparison. TECHNIQUE: Single AP view of the chest was obtained.  
   
_______________ FINDINGS:  
   
 HEART, VESSELS, MEDIASTINUM: Heart size is normal. No vascular congestion. LUNGS, PLEURAL SPACES: The lungs are clear. No effusion or pneumothorax. BONY THORAX, SOFT TISSUES: Unremarkable.  
   
_______________ Medical Decision Making I am the first provider for this patient. I reviewed the vital signs, available nursing notes, past medical history, past surgical history, family history and social history. Vital Signs: Reviewed the patient's vital signs. Pulse Oximetry Analysis:  100% on RA Records Reviewed: Nursing Notes and Old Medical Records Procedures: 
Procedures ED Course:  
3:22 PM Initial assessment performed. The patients presenting problems have been discussed, and they are in agreement with the care plan formulated and outlined with them. I have encouraged them to ask questions as they arise throughout their visit. Case discussed with Tremayne Wise, ortho on call for Dr. Alia Aranda, recommends MRI of knee and admission to hospitalist for fever of unknown origin SIGN OUT: 
Patient's presentation, labs/imaging and plan of care was reviewed with Nena Tolliver PA-C as part of sign out. They will follow up on PVL, CTA as part of the plan discussed with the patient. Nena Tolliver PA-C's assistance in completion of this plan is greatly appreciated but it should be noted that I will be the provider of record for this patient. 7:30 PM progress note Patient updated on results and plan for admission. 7:53 PM consult note Case discussed with hospitalist Dr. Arash Smith, in person in the ED. She will admit patient to medical floor. Diagnosis and Disposition ADMISSION NOTE: 
7:53 PM 
Patient is being admitted to the hospital by Dr. Arash Smith. The results of their tests and reasons for their admission have been discussed with them and/or available family.  They convey agreement and understanding for the need to be admitted and for their admission diagnosis. CLINICAL IMPRESSION: 
 
1. Post-procedural fever 2. Status post total left knee replacement PLAN: 
1. Admit Please note that this dictation was completed with [employer], the computer voice recognition software. Quite often unanticipated grammatical, syntax, homophones, and other interpretive errors are inadvertently transcribed by the computer software. Please disregard these errors. Please excuse any errors that have escaped final proofreading.

## 2020-07-11 NOTE — PROGRESS NOTES
Pharmacy Dosing Services: Vancomycin    Consult for Vancomycin Dosing by Pharmacy by Dr. Venu Wong provided for this 72y.o. year old male , for indication of Bone and joint infecion. Day of Therapy 1    Ht Readings from Last 1 Encounters:   07/11/20 175.3 cm (69\")        Wt Readings from Last 1 Encounters:   07/11/20 77.1 kg (170 lb)        Other Current Antibiotics Cefepime   Serum Creatinine Lab Results   Component Value Date/Time    Creatinine 0.88 07/11/2020 03:00 PM      Creatinine Clearance Estimated Creatinine Clearance: 83.7 mL/min (based on SCr of 0.88 mg/dL). BUN Lab Results   Component Value Date/Time    BUN 8 07/11/2020 03:00 PM      WBC Lab Results   Component Value Date/Time    WBC 6.0 07/11/2020 03:00 PM      H/H Lab Results   Component Value Date/Time    HGB 10.2 (L) 07/11/2020 03:00 PM      Platelets Lab Results   Component Value Date/Time    PLATELET 070 33/85/2336 03:00 PM      Temp (!) 100.7 °F (38.2 °C)       Start Vancomycin 1250 mg Q12H at 1600 on 07/11/2020    Dose calculated to approximate a therapeutic trough of 15-20mcg/mL. Pharmacy to follow daily and will make changes to dose and/or frequency based on clinical status.     Pharmacist Neo Monroe Contact: [phone]

## 2020-07-11 NOTE — ED TRIAGE NOTES
Pt ambulated into er with complaints of fever x 1 day. Pt reports that he had total knee replacement done on 7/9/2020.  Pt states that was told to come in by ortho on call due to fever being 101F

## 2020-07-12 VITALS
DIASTOLIC BLOOD PRESSURE: 82 MMHG | TEMPERATURE: 100.1 F | SYSTOLIC BLOOD PRESSURE: 134 MMHG | OXYGEN SATURATION: 97 % | HEART RATE: 75 BPM | RESPIRATION RATE: 16 BRPM

## 2020-07-12 PROBLEM — Z96.651 S/P TKR (TOTAL KNEE REPLACEMENT), RIGHT: Status: ACTIVE | Noted: 2020-07-12

## 2020-07-12 LAB
ANION GAP SERPL CALC-SCNC: 7 MMOL/L (ref 3–18)
BUN SERPL-MCNC: 8 MG/DL (ref 7–18)
BUN/CREAT SERPL: 12 (ref 12–20)
CALCIUM SERPL-MCNC: 8 MG/DL (ref 8.5–10.1)
CHLORIDE SERPL-SCNC: 106 MMOL/L (ref 100–111)
CO2 SERPL-SCNC: 26 MMOL/L (ref 21–32)
CREAT SERPL-MCNC: 0.69 MG/DL (ref 0.6–1.3)
ERYTHROCYTE [DISTWIDTH] IN BLOOD BY AUTOMATED COUNT: 13.5 % (ref 11.6–14.5)
GLUCOSE SERPL-MCNC: 96 MG/DL (ref 74–99)
HCT VFR BLD AUTO: 31 % (ref 36–48)
HGB BLD-MCNC: 10.1 G/DL (ref 13–16)
MCH RBC QN AUTO: 33.4 PG (ref 24–34)
MCHC RBC AUTO-ENTMCNC: 32.6 G/DL (ref 31–37)
MCV RBC AUTO: 102.6 FL (ref 74–97)
PLATELET # BLD AUTO: 203 K/UL (ref 135–420)
PMV BLD AUTO: 9.7 FL (ref 9.2–11.8)
POTASSIUM SERPL-SCNC: 4.1 MMOL/L (ref 3.5–5.5)
RBC # BLD AUTO: 3.02 M/UL (ref 4.7–5.5)
SODIUM SERPL-SCNC: 139 MMOL/L (ref 136–145)
WBC # BLD AUTO: 5.9 K/UL (ref 4.6–13.2)

## 2020-07-12 PROCEDURE — 74011250637 HC RX REV CODE- 250/637: Performed by: FAMILY MEDICINE

## 2020-07-12 PROCEDURE — 74011000250 HC RX REV CODE- 250: Performed by: PHYSICIAN ASSISTANT

## 2020-07-12 PROCEDURE — 3331090002 HH PPS REVENUE DEBIT

## 2020-07-12 PROCEDURE — 85027 COMPLETE CBC AUTOMATED: CPT

## 2020-07-12 PROCEDURE — 74011250636 HC RX REV CODE- 250/636: Performed by: PHYSICIAN ASSISTANT

## 2020-07-12 PROCEDURE — 3331090001 HH PPS REVENUE CREDIT

## 2020-07-12 PROCEDURE — 80048 BASIC METABOLIC PNL TOTAL CA: CPT

## 2020-07-12 PROCEDURE — 65660000000 HC RM CCU STEPDOWN

## 2020-07-12 RX ADMIN — Medication 500 MG: at 08:44

## 2020-07-12 RX ADMIN — Medication 10 ML: at 08:45

## 2020-07-12 RX ADMIN — Medication 500 MG: at 22:00

## 2020-07-12 RX ADMIN — CEFEPIME HYDROCHLORIDE 2 G: 2 INJECTION, POWDER, FOR SOLUTION INTRAVENOUS at 08:44

## 2020-07-12 RX ADMIN — Medication 5 MG: at 22:00

## 2020-07-12 RX ADMIN — CEFEPIME HYDROCHLORIDE 2 G: 2 INJECTION, POWDER, FOR SOLUTION INTRAVENOUS at 17:10

## 2020-07-12 RX ADMIN — Medication 10 ML: at 22:01

## 2020-07-12 RX ADMIN — VITAMIN D, TAB 1000IU (100/BT) 2 TABLET: 25 TAB at 08:44

## 2020-07-12 RX ADMIN — VANCOMYCIN HYDROCHLORIDE 1250 MG: 1.25 INJECTION, POWDER, LYOPHILIZED, FOR SOLUTION INTRAVENOUS at 17:10

## 2020-07-12 RX ADMIN — CEFEPIME HYDROCHLORIDE 2 G: 2 INJECTION, POWDER, FOR SOLUTION INTRAVENOUS at 22:00

## 2020-07-12 RX ADMIN — VANCOMYCIN HYDROCHLORIDE 1250 MG: 1.25 INJECTION, POWDER, LYOPHILIZED, FOR SOLUTION INTRAVENOUS at 04:04

## 2020-07-12 RX ADMIN — ZINC SULFATE 220 MG (50 MG) CAPSULE 1 CAPSULE: CAPSULE at 08:44

## 2020-07-12 RX ADMIN — FAMOTIDINE 20 MG: 20 TABLET, FILM COATED ORAL at 22:00

## 2020-07-12 RX ADMIN — Medication 10 ML: at 17:10

## 2020-07-12 RX ADMIN — FAMOTIDINE 20 MG: 20 TABLET, FILM COATED ORAL at 08:44

## 2020-07-12 NOTE — PROGRESS NOTES
Problem: Falls - Risk of  Goal: *Absence of Falls  Description: Document Jocelyn Moser Fall Risk and appropriate interventions in the flowsheet.   Outcome: Progressing Towards Goal  Note: Fall Risk Interventions:            Medication Interventions: Patient to call before getting OOB, Teach patient to arise slowly                   Problem: Patient Education: Go to Patient Education Activity  Goal: Patient/Family Education  Outcome: Progressing Towards Goal

## 2020-07-12 NOTE — ED NOTES
Attempted to call report to [facility], they were unable to find nurse due to not having phones. Pt was brought upstairs, vital signs were taken by ED tech at [facility], prior to going up. Crow RN was able to call me back after pt arrived, and received Report.  Crow was very professional, we used MESERET, MAR.

## 2020-07-12 NOTE — PROGRESS NOTES
Problem: Falls - Risk of  Goal: *Absence of Falls  Description: Document Padilla Camacho Fall Risk and appropriate interventions in the flowsheet.   Outcome: Progressing Towards Goal  Note: Fall Risk Interventions:            Medication Interventions: Patient to call before getting OOB, Teach patient to arise slowly

## 2020-07-12 NOTE — PROGRESS NOTES
TRANSFER - IN REPORT:    Verbal report received from [facility], RN(name) on Sailaja Chapa  being received from ED(unit) for routine progression of care      Report consisted of patients Situation, Background, Assessment and   Recommendations(SBAR). Information from the following report(s) SBAR, Kardex, ED Summary, Intake/Output, MAR, Recent Results, Med Rec Status and Cardiac Rhythm NSR was reviewed with the receiving nurse. Opportunity for questions and clarification was provided. Assessment completed upon patients arrival to unit and care assumed. 2213  Admission Assessment complete. 2300  Pt requested ice pack for Left knee   2337  Reassessment complete   0343  Reassessment complete     Bedside and Verbal shift change report given to [facility], RN (oncoming nurse) by Jarek Sutton RN (offgoing nurse). Report included the following information SBAR, Kardex, ED Summary, Intake/Output, MAR, Recent Results, Med Rec Status and Cardiac Rhythm NSR.

## 2020-07-12 NOTE — PROGRESS NOTES
Problem: Falls - Risk of  Goal: *Absence of Falls  Description: Document Stansumi Carrel Fall Risk and appropriate interventions in the flowsheet.   Outcome: Progressing Towards Goal  Note: Fall Risk Interventions:            Medication Interventions: Patient to call before getting OOB, Teach patient to arise slowly                   Problem: Patient Education: Go to Patient Education Activity  Goal: Patient/Family Education  Outcome: Progressing Towards Goal

## 2020-07-12 NOTE — ED NOTES
MRI called ED to inform pt is done with his study. Instructed ED tech to get pt from MRI, get a new set of VS, and take pt upstairs to the floor.

## 2020-07-12 NOTE — H&P
History & Physical    Patient: Iris Mcfarlane MRN: [id number]  CSN: [id number]    YOB: 1955  Age: 72 y.o. Sex: male      DOA: 7/11/2020  Primary Care Provider:  Charity Peñaloza MD      Assessment/Plan   72 y.o. male with past medical history of RA and recent (2 days ago) left total knee replacement presents to the emergency department complaining of fever. Fever -  No apparent source  Immunocompromised state  Lactic acid wnl  UA neg  No leukocytosis   CTA chest to eval for possible PE, neg  Duplex studies to eval for DVT, neg for bilateral DVTs  COVID-19 test pending, very low suspicion  Blood cultures pending  Orthopaedic surgery consult -advised admission, antibiotics and MRI   MRI pending   Continue Vancomycin and Cefepime     COVID-19 suspect -  Low suspicion   Will start vitamin c, vitamin d, zinc and melatonin   Low dose AC if ok with ortho  Hold decadron     Rheumatoid Arthritis -  Has been off methotrexate since before surgery     DVT Prophyalxis -SCDs, will start AC if ok with Ortho       Patient Active Problem List   Diagnosis Code    Osteoarthritis of left knee M17.12    Fever R50.9     Estimated length of stay : 2-3 days    CC: Fever       HPI:     Iris Mcfarlane is a 72 y.o. male with past medical history of total knee replacement 2 days ago by Dr. Jose Enrique Boucher RA presents to the emergency department complaining of fever. Patient was told to call his orthopaedic surgeon's office if he developed a fever. He noticed that his temperature was elevated this AM. His home health nurse took his temperature and it was 100.5. She told him to watch his temperature and if it reached 101 to call Dr. Sherrie Dugan office. His temperature reached 101, he called Dr. Sherrie Dugan office and he was advised to go to the ED. After his surgery, he said that he was discharged home on aspirin, Percocet, and cephalosporin.   He does have a history of RA and takes methotrexate which he discontinued the day prior to surgery and was instructed to stay off of it for 2 weeks following. Patient denies any cough, chest pain, knee pain, shortness of breath. Past Medical History:   Diagnosis Date    Arthritis     Autoimmune disease ([address]     rheumatoid arthritis    Osteoarthritis of left knee 7/8/2020       Past Surgical History:   Procedure Laterality Date    HX ORTHOPAEDIC  2019    right carpal tunnel release     HX ROTATOR CUFF REPAIR  2014    left       History reviewed. No pertinent family history. Social History     Socioeconomic History    Marital status:      Spouse name: Not on file    Number of children: Not on file    Years of education: Not on file    Highest education level: Not on file   Tobacco Use    Smoking status: Former Smoker    Smokeless tobacco: Never Used   Substance and Sexual Activity    Alcohol use: Yes     Alcohol/week: 4.0 standard drinks     Types: 4 Cans of beer per week    Drug use: Never       Prior to Admission medications    Medication Sig Start Date End Date Taking? Authorizing Provider   aspirin delayed-release 81 mg tablet Take 1 Tab by mouth two (2) times a day for 21 days. 7/9/20 7/30/20  [facility] SEBASTIAN MIGUEL   meloxicam (Mobic) 7.5 mg tablet Take 1 Tab by mouth two (2) times a day for 14 days. 7/9/20 7/23/20  [facility] SEBASTIAN MIGUEL   oxyCODONE-acetaminophen (PERCOCET) 5-325 mg per tablet Take 1 Tab by mouth every four (4) hours as needed for Pain for up to 7 days. Max Daily Amount: 6 Tabs. 7/9/20 7/16/20  [facility] SEBASTIAN MIGUEL   cefadroxil (DURICEF) 500 mg capsule Take 1 Cap by mouth two (2) times a day for 5 days. 7/9/20 7/14/20  SEBASTIAN Tilley   cholecalciferol (Vitamin D3) 25 mcg (1,000 unit) cap Take 1,000 Units by mouth daily. Provider, Historical   folic acid (FOLVITE) 1 mg tablet Take 1 mg by mouth daily.     Provider, Historical       Allergies   Allergen Reactions    Sulfa (Sulfonamide Antibiotics) Diarrhea       Review of Systems  Gen: +fever, no chills, malaise, weight loss/gain. Heent: No headache, rhinorrhea, epistaxis, ear pain, hearing loss, sinus pain, neck pain/stiffness, sore throat. Heart: No chest pain, palpitations, ASTORGA, pnd, or orthopnea. Resp: No cough, hemoptysis, wheezing and shortness of breath. GI: No nausea, vomiting, diarrhea, constipation, melena or hematochezia. : No urinary obstruction, dysuria or hematuria. Derm: No rash, new skin lesion or pruritis. Musc/skeletal: no bone or joint complains. Vasc: No edema, cyanosis or claudication. Endo: No heat/cold intolerance, no polyuria,polydipsia or polyphagia. Neuro: No unilateral weakness, numbness, tingling. No seizures. Heme: No easy bruising or bleeding. Physical Exam:     Physical Exam:  Visit Vitals  /72   Pulse 67   Temp 99.4 °F (37.4 °C)   Resp 14   Ht 5' 9\" (1.753 m)   Wt 77.1 kg (170 lb)   SpO2 99%   BMI 25.10 kg/m²           Temp (24hrs), Av.1 °F (37.8 °C), Min:99.4 °F (37.4 °C), Max:100.7 °F (38.2 °C)    No intake/output data recorded. No intake/output data recorded. General:  Awake, cooperative, no distress. Head:  Normocephalic, without obvious abnormality, atraumatic. Eyes:  Conjunctivae/corneas clear, sclera anicteric, PERRL, EOMs intact. Nose: Nares normal. No drainage or sinus tenderness. Throat: Lips, mucosa, and tongue normal.    Neck: Supple, symmetrical, trachea midline, no adenopathy. Lungs:   Clear to auscultation bilaterally. Heart:  Regular rate and rhythm, S1, S2 normal, no murmur, click, rub or gallop. Abdomen: Soft, non-tender. Bowel sounds normal. No masses,  No organomegaly. Extremities:  Edematous right knee, limited ROM due to pain and recent surgery. Incision C/D/I   Pulses: 2+ and symmetric all extremities. Skin: Skin color as appropriate for ethnicity, turgor normal. No rashes or lesions   Neurologic: CNII-XII intact. No focal motor or sensory deficit.        Labs Reviewed:  Recent Results (from the past 24 hour(s))   CBC WITH AUTOMATED DIFF    Collection Time: 07/11/20  3:00 PM   Result Value Ref Range    WBC 6.0 4.6 - 13.2 K/uL    RBC 3.10 (L) 4.70 - 5.50 M/uL    HGB 10.2 (L) 13.0 - 16.0 g/dL    HCT 32.2 (L) 36.0 - 48.0 %    .9 (H) 74.0 - 97.0 FL    MCH 32.9 24.0 - 34.0 PG    MCHC 31.7 31.0 - 37.0 g/dL    RDW 13.1 11.6 - 14.5 %    PLATELET 948 559 - 712 K/uL    MPV 9.7 9.2 - 11.8 FL    NEUTROPHILS 75 (H) 40 - 73 %    LYMPHOCYTES 7 (L) 21 - 52 %    MONOCYTES 17 (H) 3 - 10 %    EOSINOPHILS 1 0 - 5 %    BASOPHILS 0 0 - 2 %    ABS. NEUTROPHILS 4.5 1.8 - 8.0 K/UL    ABS. LYMPHOCYTES 0.4 (L) 0.9 - 3.6 K/UL    ABS. MONOCYTES 1.0 0.05 - 1.2 K/UL    ABS. EOSINOPHILS 0.1 0.0 - 0.4 K/UL    ABS. BASOPHILS 0.0 0.0 - 0.1 K/UL    RBC COMMENTS NORMOCYTIC, NORMOCHROMIC      DF MANUAL     METABOLIC PANEL, COMPREHENSIVE    Collection Time: 07/11/20  3:00 PM   Result Value Ref Range    Sodium 138 136 - 145 mmol/L    Potassium 3.8 3.5 - 5.5 mmol/L    Chloride 105 100 - 111 mmol/L    CO2 29 21 - 32 mmol/L    Anion gap 4 3.0 - 18 mmol/L    Glucose 110 (H) 74 - 99 mg/dL    BUN 8 7.0 - 18 MG/DL    Creatinine 0.88 0.6 - 1.3 MG/DL    BUN/Creatinine ratio 9 (L) 12 - 20      GFR est AA >60 >60 ml/min/1.73m2    GFR est non-AA >60 >60 ml/min/1.73m2    Calcium 8.2 (L) 8.5 - 10.1 MG/DL    Bilirubin, total 0.9 0.2 - 1.0 MG/DL    ALT (SGPT) 25 16 - 61 U/L    AST (SGOT) 35 10 - 38 U/L    Alk.  phosphatase 38 (L) 45 - 117 U/L    Protein, total 5.8 (L) 6.4 - 8.2 g/dL    Albumin 3.2 (L) 3.4 - 5.0 g/dL    Globulin 2.6 2.0 - 4.0 g/dL    A-G Ratio 1.2 0.8 - 1.7     LACTIC ACID    Collection Time: 07/11/20  3:00 PM   Result Value Ref Range    Lactic acid 1.1 0.4 - 2.0 MMOL/L   PROTHROMBIN TIME + INR    Collection Time: 07/11/20  3:00 PM   Result Value Ref Range    Prothrombin time 13.4 11.5 - 15.2 sec    INR 1.0 0.8 - 1.2     PTT    Collection Time: 07/11/20  3:00 PM   Result Value Ref Range    aPTT 27.6 23.0 - 36.4 SEC   URINALYSIS W/ RFLX MICROSCOPIC    Collection Time: 07/11/20  4:30 PM   Result Value Ref Range    Color YELLOW      Appearance CLEAR      Specific gravity 1.010 1.005 - 1.030      pH (UA) 8.0 5.0 - 8.0      Protein Negative NEG mg/dL    Glucose Negative NEG mg/dL    Ketone Negative NEG mg/dL    Bilirubin Negative NEG      Blood Negative NEG      Urobilinogen 1.0 0.2 - 1.0 EU/dL    Nitrites Negative NEG      Leukocyte Esterase Negative NEG     SARS-COV-2    Collection Time: 07/11/20  5:00 PM   Result Value Ref Range    SARS-CoV-2 PENDING     Specimen source Nasopharyngeal         Procedures/imaging: see electronic medical records for all procedures/Xrays and details which were not copied into this note but were reviewed prior to creation of Plan      CC: Radha Martínez MD

## 2020-07-12 NOTE — PROGRESS NOTES
Problem: Patient Education: Go to Patient Education Activity  Goal: Patient/Family Education  Outcome: Progressing Towards Goal     Problem: Pain  Goal: *Control of Pain  Outcome: Progressing Towards Goal  Goal: *PALLIATIVE CARE:  Alleviation of Pain  Outcome: Progressing Towards Goal     Problem: Patient Education: Go to Patient Education Activity  Goal: Patient/Family Education  Outcome: Progressing Towards Goal

## 2020-07-12 NOTE — PROGRESS NOTES
Hospitalist Progress Note    Patient: Clarnce Collet MRN: [id number]  CSN: [id number]    YOB: 1955  Age: 72 y.o. Sex: male    DOA: 7/11/2020 LOS:  LOS: 1 day          Chief Complaint:    Fever   Assessment/Plan   72 y.o. male with past medical history of RA and recent (2 days ago) left total knee replacement presents to the emergency department complaining of fever.      Fever -  No apparent source, possible po-op fever  Follow cultures   Orthopaedic surgery consult -advised admission, antibiotics and MRI   MRI still pending   Continue Vancomycin and Cefepime      COVID-19 suspect -  Low suspicion   Vitamin c, vitamin d, zinc and melatonin   Low dose AC if ok with ortho  Hold decadron      Rheumatoid Arthritis -  Has been off methotrexate since before surgery      DVT Prophyalxis -SCDs, will start AC if ok with Ortho     Disposition :  Patient Active Problem List   Diagnosis Code    Osteoarthritis of left knee M17.12    Fever of unknown origin R50.9    RA (rheumatoid arthritis) ([facility] M06.9       Subjective:    Has no new complaints this AM.  Resting comfortably. Wants to elevate his leg       Review of systems:    Constitutional: denies fevers, chills, myalgias  Respiratory: denies SOB, cough  Cardiovascular: denies chest pain, palpitations  Gastrointestinal: denies nausea, vomiting, diarrhea      Vital signs/Intake and Output:  Visit Vitals  /66 (BP 1 Location: Left arm, BP Patient Position: At rest;Supine)   Pulse 69   Temp 98.5 °F (36.9 °C)   Resp 16   Ht 5' 9\" (1.753 m)   Wt 77.1 kg (170 lb)   SpO2 97%   BMI 25.10 kg/m²     Current Shift:  No intake/output data recorded. Last three shifts:  No intake/output data recorded.     Exam:    General: Well developed, alert, NAD, OX3  Head/Neck: NCAT, supple, No masses, No lymphadenopathy  CVS:Regular rate and rhythm, no M/R/G, S1/S2 heard, no thrill  Lungs:Clear to auscultation bilaterally, no wheezes, rhonchi, or rales  Abdomen: Soft, Nontender, No distention, Normal Bowel sounds, No hepatomegaly  Extremities: edematous right knee, limited ROM due to recent surgery, 2+ pedal pulses with, compression stocking in place   Skin:normal texture and turgor, no rashes, no lesions  Neuro:grossly normal , follows commands  Psych:appropriate                Labs: Results:       Chemistry Recent Labs     07/11/20  1500   *      K 3.8      CO2 29   BUN 8   CREA 0.88   CA 8.2*   AGAP 4   BUCR 9*   AP 38*   TP 5.8*   ALB 3.2*   GLOB 2.6   AGRAT 1.2      CBC w/Diff Recent Labs     07/11/20  1500   WBC 6.0   RBC 3.10*   HGB 10.2*   HCT 32.2*      GRANS 75*   LYMPH 7*   EOS 1      Cardiac Enzymes No results for input(s): CPK, CKND1, JOSE in the last 72 hours. No lab exists for component: CKRMB, TROIP   Coagulation Recent Labs     07/11/20  1500   PTP 13.4   INR 1.0   APTT 27.6       Lipid Panel No results found for: CHOL, CHOLPOCT, CHOLX, CHLST, CHOLV, [id number], HDL, HDLP, LDL, LDLC, DLDLP, [id number], VLDLC, VLDL, TGLX, TRIGL, TRIGP, TGLPOCT, CHHD, CHHDX   BNP No results for input(s): BNPP in the last 72 hours.    Liver Enzymes Recent Labs     07/11/20  1500   TP 5.8*   ALB 3.2*   AP 38*      Thyroid Studies No results found for: T4, T3U, TSH, TSHEXT     Procedures/imaging: see electronic medical records for all procedures/Xrays and details which were not copied into this note but were reviewed prior to creation of Suraj Pena MD

## 2020-07-12 NOTE — PROGRESS NOTES
Bedside and Verbal shift change report given to Wero Condon (oncoming nurse) by Melodie Puente (offgoing nurse). Report included the following information SBAR, Kardex, Intake/Output, MAR and Recent Results.      Patient had a uneventful shift

## 2020-07-12 NOTE — PROGRESS NOTES
DC Plan: Discharge home with [facility], MD follow up, family assistance once medically stable    Chart reviewed. Pt admitted to tele by hospitalist.  Pt noted to be covid r/o pending 7/11. Pt recently had surgery with MD Malick Birmingham. Called into pt room to discuss dc plan. CM role explained. Pt eager to discharge. Pt lives with wife. Wife to drive home at discharge. Pt active with [facility], [facility] offered, would like to cont with [facility]. Referral placed. Pt has RW. No dc concerns identified. CM will cont to follow for transition of care needs and will be available via hospital  on weekends. Reason for Admission:   Per h&p pt is \"is a 72 y.o. male with past medical history of total knee replacement 2 days ago by Dr. Malick Birmingham RA presents to the emergency department complaining of fever. Patient was told to call his orthopaedic surgeon's office if he developed a fever. He noticed that his temperature was elevated this AM. His home health nurse took his temperature and it was 100.5. She told him to watch his temperature and if it reached 101 to call Dr. Chaim Richardson office. His temperature reached 101, he called Dr. Chaim Richardson office and he was advised to go to the ED.  After his surgery, he said that he was discharged home on aspirin, Percocet, and cephalosporin. Monica Krishna does have a history of RA and takes methotrexate which he discontinued the day prior to surgery and was instructed to stay off of it for 2 weeks following.  Patient denies any cough, chest pain, knee pain, shortness of breath.   \"                   RUR Score:         low            Plan for utilizing home health:      Yes [facility]    PCP: First and Last name:  Ramon Correa as MD Sang Ghosh   Name of Practice:    Are you a current patient: Yes/No:    Approximate date of last visit:    Can you participate in a virtual visit with your PCP:                     Current Advanced Directive/Advance Care Plan:                          Transition of Care Plan:   Home with MD follow up and [facility]UCINDA [facility]    Care Management Interventions  Mode of Transport at Discharge:  Other (see comment)(wife)  Transition of Care Consult (CM Consult): Home Health, Discharge [address]: Yes  Current Support Network: Lives with Spouse  Confirm Follow Up Transport: Family  The Plan for Transition of Care is Related to the Following Treatment Goals : home with home health  The Patient and/or Patient Representative was Provided with a Choice of Provider and Agrees with the Discharge Plan?: Yes  Name of the Patient Representative Who was Provided with a Choice of Provider and Agrees with the Discharge Plan: patient  Freedom of Choice List was Provided with Basic Dialogue that Supports the Patient's Individualized Plan of Care/Goals, Treatment Preferences and Shares the Quality Data Associated with the Providers?: Yes  Discharge Location  Discharge Placement: Home with home health

## 2020-07-12 NOTE — CONSULTS
consult History and Physical        Chief Complaint:     L knee pain, fever    Assessment & Plan:   Final recommendation per attending orthopedist      FUO   BC pending, COVID-19 pending, no leukocytosis, UA neg, chest CT neg for PE, PVL neg for DVT. Continue with management per hospitalist.    S/P L TKR   Significant effusion, incision was inspected and did not show erythema, drainages, or bleeding. MRI pending   Low dose AC OK   WBAT, up AD ELVER, PT and OT to ambulate. Subjective:      HPI:    Tyree Ayers is a 72 y.o. male with hx of RA, who has been followed at [facility] by Dr. Francy Zarate. He had a L TKR on 7/9 and was discharged with aspirin, percocet, and cephalosporin. He reports developing some fever since discharged. He states his home nurse reports temp of 100.5 and to call [facility] if his temperature reaches 101. On 7/11, he paged me because his temperature reached 101.5. I told him to go to ER for work up. While in ER, sepsis, PE, and DVT work up were performed. No evidence of PE or DVT at this time. Blood culture is pending at this time. He also was swabbed for COVID. He was started on Vancomycin and cefepime. He was admitted to inpatient. Currently, he denies CP, SOB, or calf pain. He further denies pain of knee. PMHx:   Active Ambulatory Problems     Diagnosis Date Noted    Osteoarthritis of left knee 07/08/2020     Resolved Ambulatory Problems     Diagnosis Date Noted    No Resolved Ambulatory Problems     Past Medical History:   Diagnosis Date    Arthritis     Auto[address] [address]         PSHx:   Past Surgical History:   Procedure Laterality Date    HX ORTHOPAEDIC  2019    right carpal tunnel release     HX ROTATOR CUFF REPAIR  2014    left         FaHx: History reviewed. No pertinent family history. Allergies:    Allergies   Allergen Reactions    Sulfa (Sulfonamide Antibiotics) Diarrhea       Home Medications:     Prior to Admission medications    Medication Sig Start Date End Date Taking? Authorizing Provider   aspirin delayed-release 81 mg tablet Take 1 Tab by mouth two (2) times a day for 21 days. 7/9/20 7/30/20  [facility] SEBASTIAN MIGUEL   meloxicam (Mobic) 7.5 mg tablet Take 1 Tab by mouth two (2) times a day for 14 days. 7/9/20 7/23/20  [facility] SEBASTIAN MIGUEL   oxyCODONE-acetaminophen (PERCOCET) 5-325 mg per tablet Take 1 Tab by mouth every four (4) hours as needed for Pain for up to 7 days. Max Daily Amount: 6 Tabs. 7/9/20 7/16/20  [facility] SEBASTIAN MIGUEL   cefadroxil (DURICEF) 500 mg capsule Take 1 Cap by mouth two (2) times a day for 5 days. 7/9/20 7/14/20  SEBASTIAN Henley   cholecalciferol (Vitamin D3) 25 mcg (1,000 unit) cap Take 1,000 Units by mouth daily. Provider, Historical   folic acid (FOLVITE) 1 mg tablet Take 1 mg by mouth daily. Provider, Historical        Review of Systems:   Constitutional: Negative for fever as of this morning on 7/12, unintentional weight loss. Eyes: Negative for visual changes. Respiratory:  Negative for shortness of breath, cough, wheezing. Cardiovascular: Negative for chest pain, palpitations. Gastrointestinal: Negative for dysphagia. Genitourinary: Negative for dysuria. Integumentary: Negative for rash. Musculoskeletal: See HPI  Neurological: Negative for diplopia. Behavioral/Psych: Negative for thoughts of suicide. Physical Assessment:     Visit Vitals  /59 (BP 1 Location: Left arm, BP Patient Position: At rest;Supine)   Pulse 68   Temp 98.7 °F (37.1 °C)   Resp 16   Ht 5' 9\" (1.753 m)   Wt 77.1 kg (170 lb)   SpO2 97%   BMI 25.10 kg/m²       General:  Pt is a well developed, well nourished 72 y.o. male who appears stated age. HEENT:   Head is atraumatic and normocephalic. PERRLA. EOM intact. Lungs:  breathing unlabored. Heart:  B/L PT and DP pulses 2+ and regular rate. Extremities:  L knee: Significant effusion noted, diffuse ecchymosis. No significant tenderness.  Incision was inspected and no signs of erythema, drainages, or bleeding. Incision is intact.         SEBASTIAN Sosa

## 2020-07-12 NOTE — PROGRESS NOTES
PT orders received and chart reviewed. Pt being R/O for COVID-19 and had L TKA 07/09/2020 by Dr. Charles Said. After discussion w/ Nurse Alan Jose, PT eval is appropriate after test results as pt is ambulating in room w/o difficulty, RW use and getting self to/from bathroom mod I. Will continue to monitor pt.    1336  Called pt via phone and spoke w/ pt regarding mobility. Pt reports he is moving around just fine. \"No problems with moving around. I want to see the doctor so I can get out of here. \"  Encouraged pt to continue to use RW and ambulate in room, perform HEP. Pt understands and voiced he would like more ice packs for icing benefit.

## 2020-07-13 ENCOUNTER — HOME CARE VISIT (OUTPATIENT)
Dept: HOME HEALTH SERVICES | Facility: HOME HEALTH | Age: 65
End: 2020-07-13
Payer: COMMERCIAL

## 2020-07-13 VITALS
RESPIRATION RATE: 16 BRPM | OXYGEN SATURATION: 98 % | HEART RATE: 66 BPM | HEIGHT: 69 IN | BODY MASS INDEX: 25.95 KG/M2 | SYSTOLIC BLOOD PRESSURE: 144 MMHG | TEMPERATURE: 98.9 F | DIASTOLIC BLOOD PRESSURE: 75 MMHG | WEIGHT: 175.2 LBS

## 2020-07-13 LAB
ANION GAP SERPL CALC-SCNC: 4 MMOL/L (ref 3–18)
BUN SERPL-MCNC: 9 MG/DL (ref 7–18)
BUN/CREAT SERPL: 13 (ref 12–20)
CALCIUM SERPL-MCNC: 8.1 MG/DL (ref 8.5–10.1)
CHLORIDE SERPL-SCNC: 105 MMOL/L (ref 100–111)
CO2 SERPL-SCNC: 30 MMOL/L (ref 21–32)
CREAT SERPL-MCNC: 0.68 MG/DL (ref 0.6–1.3)
ERYTHROCYTE [DISTWIDTH] IN BLOOD BY AUTOMATED COUNT: 13.2 % (ref 11.6–14.5)
GLUCOSE SERPL-MCNC: 99 MG/DL (ref 74–99)
HCT VFR BLD AUTO: 31.6 % (ref 36–48)
HGB BLD-MCNC: 10.3 G/DL (ref 13–16)
MCH RBC QN AUTO: 33.1 PG (ref 24–34)
MCHC RBC AUTO-ENTMCNC: 32.6 G/DL (ref 31–37)
MCV RBC AUTO: 101.6 FL (ref 74–97)
PLATELET # BLD AUTO: 225 K/UL (ref 135–420)
PMV BLD AUTO: 9.5 FL (ref 9.2–11.8)
POTASSIUM SERPL-SCNC: 4.1 MMOL/L (ref 3.5–5.5)
RBC # BLD AUTO: 3.11 M/UL (ref 4.7–5.5)
SODIUM SERPL-SCNC: 139 MMOL/L (ref 136–145)
WBC # BLD AUTO: 5.5 K/UL (ref 4.6–13.2)

## 2020-07-13 PROCEDURE — 85027 COMPLETE CBC AUTOMATED: CPT

## 2020-07-13 PROCEDURE — 74011250636 HC RX REV CODE- 250/636: Performed by: PHYSICIAN ASSISTANT

## 2020-07-13 PROCEDURE — 74011000250 HC RX REV CODE- 250: Performed by: PHYSICIAN ASSISTANT

## 2020-07-13 PROCEDURE — 3331090001 HH PPS REVENUE CREDIT

## 2020-07-13 PROCEDURE — 3331090002 HH PPS REVENUE DEBIT

## 2020-07-13 PROCEDURE — 80048 BASIC METABOLIC PNL TOTAL CA: CPT

## 2020-07-13 PROCEDURE — 74011250637 HC RX REV CODE- 250/637: Performed by: FAMILY MEDICINE

## 2020-07-13 RX ADMIN — Medication 10 ML: at 05:58

## 2020-07-13 RX ADMIN — ZINC SULFATE 220 MG (50 MG) CAPSULE 1 CAPSULE: CAPSULE at 08:25

## 2020-07-13 RX ADMIN — Medication 500 MG: at 08:24

## 2020-07-13 RX ADMIN — CEFEPIME HYDROCHLORIDE 2 G: 2 INJECTION, POWDER, FOR SOLUTION INTRAVENOUS at 08:25

## 2020-07-13 RX ADMIN — VITAMIN D, TAB 1000IU (100/BT) 2 TABLET: 25 TAB at 08:25

## 2020-07-13 RX ADMIN — FAMOTIDINE 20 MG: 20 TABLET, FILM COATED ORAL at 08:25

## 2020-07-13 RX ADMIN — VANCOMYCIN HYDROCHLORIDE 1250 MG: 1.25 INJECTION, POWDER, LYOPHILIZED, FOR SOLUTION INTRAVENOUS at 03:39

## 2020-07-13 NOTE — DISCHARGE INSTRUCTIONS
Coronavirus (OVEXH-88): Care Instructions  Overview  The coronavirus disease (COVID-19) is caused by a virus. Symptoms may include a fever, a cough, and shortness of breath. It mainly spreads person-to-person through droplets from coughing and sneezing. The virus also can spread when people are in close contact with someone who is infected. Most people have mild symptoms and can take care of themselves at home. If their symptoms get worse, they may need care in a hospital. There is no medicine to fight the virus. It's important to not spread the virus to others. If you have COVID-19, wear a face cover anytime you are around other people. You need to isolate yourself while you are sick. Your doctor or local public health official will tell you when you no longer need to be isolated. Leave your home only if you need to get medical care. Follow-up care is a key part of your treatment and safety. Be sure to make and go to all appointments, and call your doctor if you are having problems. It's also a good idea to know your test results and keep a list of the medicines you take. How can you care for yourself at home? · Get extra rest. It can help you feel better. · Drink plenty of fluids. This helps replace fluids lost from fever. Fluids also help ease a scratchy throat. Water, soup, fruit juice, and hot tea with lemon are good choices. · Take acetaminophen (such as Tylenol) to reduce a fever. It may also help with muscle aches. Read and follow all instructions on the label. · Sponge your body with lukewarm water to help with fever. Don't use cold water or ice. · Use petroleum jelly on sore skin. This can help if the skin around your nose and lips becomes sore from rubbing a lot with tissues. Tips for isolation  · Wear a cloth face cover when you are around other people. It can help stop the spread of the virus when you cough or sneeze. · Limit contact with people in your home.  If possible, stay in a separate bedroom and use a separate bathroom. · If you have to leave home, avoid crowds and try to stay at least 6 feet away from other people. · Avoid contact with pets and other animals. · Cover your mouth and nose with a tissue when you cough or sneeze. Then throw it in the trash right away. · Wash your hands often, especially after you cough or sneeze. Use soap and water, and scrub for at least 20 seconds. If soap and water aren't available, use an alcohol-based hand . · Don't share personal household items. These include bedding, towels, cups and glasses, and eating utensils. · [address] in the warmest water allowed for the fabric type, and dry it completely. It's okay to wash other people's laundry with yours. · Clean and disinfect your home every day. Use household  and disinfectant wipes or sprays. Take special care to clean things that you grab with your hands. These include doorknobs, remote controls, phones, and handles on your refrigerator and microwave. And don't forget countertops, tabletops, bathrooms, and computer keyboards. When should you call for help? [id number] anytime you think you may need emergency care. For example, call if you have life-threatening symptoms, such as:  · You have severe trouble breathing. (You can't talk at all.)  · You have constant chest pain or pressure. · You are severely dizzy or lightheaded. · You are confused or can't think clearly. · Your face and lips have a blue color. · You pass out (lose consciousness) or are very hard to wake up. Call your doctor now or seek immediate medical care if:  · You have moderate trouble breathing. (You can't speak a full sentence.)  · You are coughing up blood (more than about 1 teaspoon). · You have signs of low blood pressure. These include feeling lightheaded; being too weak to stand; and having cold, pale, clammy skin.   Watch closely for changes in your health, and be sure to contact your doctor if:  · Your symptoms get worse. · You are not getting better as expected. Call before you go to the doctor's office. Follow their instructions. And wear a cloth face cover. Current as of: May 8, 2020               Content Version: 12.5  © 4202-8707 [employer]. Care instructions adapted under license by [employer] (which disclaims liability or warranty for this information). If you have questions about a medical condition or this instruction, always ask your healthcare professional. [place] [address] any warranty or liability for your use of this information. Patient Education        Learning About Coronavirus [phone])  Coronavirus [phone]): Overview  What is coronavirus (TIUHK-99)? The coronavirus disease (COVID-19) is caused by a virus. It is an illness that was first found in [place], [place], in December 2019. It has since spread worldwide. The virus can cause fever, cough, and trouble breathing. In severe cases, it can cause pneumonia and make it hard to breathe without help. It can cause death. Coronaviruses are a large group of viruses. They cause the common cold. They also cause more serious illnesses like Middle East respiratory syndrome (MERS) and severe acute respiratory syndrome (SARS). COVID-19 is caused by a novel coronavirus. That means it's a new type that has not been seen in people before. This virus spreads person-to-person through droplets from coughing and sneezing. It can also spread when you are close to someone who is infected. And it can spread when you touch something that has the virus on it, such as a doorknob or a tabletop. What can you do to protect yourself from coronavirus (COVID-19)? The best way to protect yourself from getting sick is to:  · Avoid areas where there is an outbreak. · Avoid contact with people who may be infected. · Wash your hands often with soap or alcohol-based hand sanitizers.   · Avoid crowds and try to stay at least 6 feet away from other people. · Wash your hands often, especially after you cough or sneeze. Use soap and water, and scrub for at least 20 seconds. If soap and water aren't available, use an alcohol-based hand . · Avoid touching your mouth, nose, and eyes. What can you do to avoid spreading the virus to others? To help avoid spreading the virus to others:  · Cover your mouth with a tissue when you cough or sneeze. Then throw the tissue in the trash. · Use a disinfectant to clean things that you touch often. · Wear a cloth face cover if you have to go to public areas. · Stay home if you are sick or have been exposed to the virus. Don't go to school, work, or public areas. And don't use public transportation, ride-shares, or taxis unless you have no choice. · If you are sick:  ? Leave your home only if you need to get medical care. But call the doctor's office first so they know you're coming. And wear a face cover. ? Wear the face cover whenever you're around other people. It can help stop the spread of the virus when you cough or sneeze. ? Clean and disinfect your home every day. Use household  and disinfectant wipes or sprays. Take special care to clean things that you grab with your hands. These include doorknobs, remote controls, phones, and handles on your refrigerator and microwave. And don't forget countertops, tabletops, bathrooms, and computer keyboards. When to call for help  [id number] anytime you think you may need emergency care. For example, call if:  · You have severe trouble breathing. (You can't talk at all.)  · You have constant chest pain or pressure. · You are severely dizzy or lightheaded. · You are confused or can't think clearly. · Your face and lips have a blue color. · You pass out (lose consciousness) or are very hard to wake up. Call your doctor now if you develop symptoms such as:  · Shortness of breath. · Fever. · Cough.   If you need to get care, call ahead to the doctor's office for instructions before you go. Make sure you wear a face cover to prevent exposing other people to the virus. Where can you get the latest information? The following health organizations are tracking and studying this virus. Their websites contain the most up-to-date information. Godwin Mcdanielsp also learn what to do if you think you may have been exposed to the virus. · U.S. Centers for Disease Control and Prevention (CDC): The CDC provides updated news about the disease and travel advice. The website also tells you how to prevent the spread of infection. www.cdc.gov  · World Health Organization [facility]): WHO offers information about the virus outbreaks. WHO also has travel advice. www.who.int  Current as of: May 8, 2020               Content Version: 12.5  © 2006-2020 Healthwise[employer]. Care instructions adapted under license by [employer] (which disclaims liability or warranty for this information). If you have questions about a medical condition or this instruction, always ask your healthcare professional. [place] [address] any warranty or liability for your use of this information. Patient Education        Coronavirus (AWZUE-88): Care Instructions  Overview  The coronavirus disease (COVID-19) is caused by a virus. Symptoms may include a fever, a cough, and shortness of breath. It mainly spreads person-to-person through droplets from coughing and sneezing. The virus also can spread when people are in close contact with someone who is infected. Most people have mild symptoms and can take care of themselves at home. If their symptoms get worse, they may need care in a hospital. There is no medicine to fight the virus. It's important to not spread the virus to others. If you have COVID-19, wear a face cover anytime you are around other people. You need to isolate yourself while you are sick.  Your doctor or local public health official will tell you when you no longer need to be isolated. Leave your home only if you need to get medical care. Follow-up care is a key part of your treatment and safety. Be sure to make and go to all appointments, and call your doctor if you are having problems. It's also a good idea to know your test results and keep a list of the medicines you take. How can you care for yourself at home? · Get extra rest. It can help you feel better. · Drink plenty of fluids. This helps replace fluids lost from fever. Fluids also help ease a scratchy throat. Water, soup, fruit juice, and hot tea with lemon are good choices. · Take acetaminophen (such as Tylenol) to reduce a fever. It may also help with muscle aches. Read and follow all instructions on the label. · Sponge your body with lukewarm water to help with fever. Don't use cold water or ice. · Use petroleum jelly on sore skin. This can help if the skin around your nose and lips becomes sore from rubbing a lot with tissues. Tips for isolation  · Wear a cloth face cover when you are around other people. It can help stop the spread of the virus when you cough or sneeze. · Limit contact with people in your home. If possible, stay in a separate bedroom and use a separate bathroom. · If you have to leave home, avoid crowds and try to stay at least 6 feet away from other people. · Avoid contact with pets and other animals. · Cover your mouth and nose with a tissue when you cough or sneeze. Then throw it in the trash right away. · Wash your hands often, especially after you cough or sneeze. Use soap and water, and scrub for at least 20 seconds. If soap and water aren't available, use an alcohol-based hand . · Don't share personal household items. These include bedding, towels, cups and glasses, and eating utensils. · [address] in the warmest water allowed for the fabric type, and dry it completely. It's okay to wash other people's laundry with yours.   · Clean and disinfect your home every day. Use household  and disinfectant wipes or sprays. Take special care to clean things that you grab with your hands. These include doorknobs, remote controls, phones, and handles on your refrigerator and microwave. And don't forget countertops, tabletops, bathrooms, and computer keyboards. When should you call for help? [id number] anytime you think you may need emergency care. For example, call if you have life-threatening symptoms, such as:  · You have severe trouble breathing. (You can't talk at all.)  · You have constant chest pain or pressure. · You are severely dizzy or lightheaded. · You are confused or can't think clearly. · Your face and lips have a blue color. · You pass out (lose consciousness) or are very hard to wake up. Call your doctor now or seek immediate medical care if:  · You have moderate trouble breathing. (You can't speak a full sentence.)  · You are coughing up blood (more than about 1 teaspoon). · You have signs of low blood pressure. These include feeling lightheaded; being too weak to stand; and having cold, pale, clammy skin. Watch closely for changes in your health, and be sure to contact your doctor if:  · Your symptoms get worse. · You are not getting better as expected. Call before you go to the doctor's office. Follow their instructions. And wear a cloth face cover. Current as of: May 8, 2020               Content Version: 12.5  © 2006-2020 Healthwise, Incorporated. Care instructions adapted under license by [employer] (which disclaims liability or warranty for this information). If you have questions about a medical condition or this instruction, always ask your healthcare professional. [place] [address] any warranty or liability for your use of this information. Patient Education        Learning About Fever  What is a fever? A fever is a high body temperature.  It's one way your body fights being sick. A fever shows that the body is responding to infection or other illnesses, both minor and severe. A fever is a symptom, not an illness by itself. A fever can be a sign that you are ill, but most fevers are not caused by a serious problem. You may have a fever with a minor illness, such as a cold. But sometimes a very serious infection may cause little or no fever. It is important to look at other symptoms, other conditions you have, and how you feel in general. In children, notice how they act and see what symptoms they complain of. What is a normal body temperature? A normal body temperature is about 98. 6ºF. Some people have a normal temperature that is a little higher or a little lower than this. Your temperature may be a little lower in the morning than it is later in the day. It may go up during hot weather or when you exercise, wear heavy clothes, or take a hot bath. Your temperature may also be different depending on how you take it. A temperature taken in the mouth (oral) or under the arm may be a little lower than your core temperature (rectal). What is a fever temperature? A core temperature of 100.4°F or above is considered a fever. What can cause a fever? A fever may be caused by:  · Infections. This is the most common cause of a fever. Examples of infections that can cause a fever include the flu, a kidney infection, or pneumonia. · Some medicines. · Severe trauma or injury, such as a heart attack, stroke, heatstroke, or burns. · Other medical conditions, such as arthritis and some cancers. How can you treat a fever at home? · Ask your doctor if you can take an over-the-counter pain medicine, such as acetaminophen (Tylenol), ibuprofen (Advil, Motrin), or naproxen (Aleve). Be safe with medicines. Read and follow all instructions on the label. · To prevent dehydration, drink plenty of fluids. Choose water and other caffeine-free clear liquids until you feel better.  If you have kidney, heart, or liver disease and have to limit fluids, talk with your doctor before you increase the amount of fluids you drink. Follow-up care is a key part of your treatment and safety. Be sure to make and go to all appointments, and call your doctor if you are having problems. It's also a good idea to know your test results and keep a list of the medicines you take. Where can you learn more? Go to http://www.gray.com/  Enter G732 in the search box to learn more about \"Learning About Fever. \"  Current as of: June 26, 2019               Content Version: 12.5  © 2176-2934 [employer]. Care instructions adapted under license by [employer] (which disclaims liability or warranty for this information). If you have questions about a medical condition or this instruction, always ask your healthcare professional. [address] any warranty or liability for your use of this information. Patient Education        Total Knee Replacement: What to Expect at [address] [place] [address] Drive had a total knee replacement. The doctor replaced the worn ends of the bones that connect to your knee (thighbone and lower leg bone) with plastic and metal parts. When you leave the hospital, you should be able to move around with a walker or crutches. But you will need someone to help you at home for the next few weeks or until you have more energy and can move around better. If you need more extensive rehab, you may go to a specialized rehab center for more treatment. You will go home with a bandage and stitches, staples, tissue glue, or tape strips. Change the bandage as your doctor tells you to. If you have stitches or staples, your doctor will remove them 10 to 21 days after your surgery. Glue or tape strips will fall off on their own over time. You may still have some mild pain, and the area may be swollen for 3 to 6 months after surgery.   Your knee will continue to improve for 6 to 12 months. You will probably use a walker for 1 to 3 weeks and then use crutches. When you are ready, you can use a cane. You will probably be able to walk on your own in 4 to 8 weeks. You will need to do months of physical rehabilitation (rehab) after a knee replacement. Rehab will help you strengthen the muscles of the knee and help you regain movement. After you recover, your artificial knee will allow you to do normal daily activities with less pain or no pain at all. You may be able to hike, dance, ride a bike, and play golf. Talk to your doctor about whether you can do more strenuous activities. Always tell your caregivers that you have an artificial knee. How long it will take to walk on your own, return to normal activities, and go back to work depends on your health and how well your rehabilitation (rehab) program goes. The better you do with your rehab exercises, the quicker you will get your strength and movement back. This care sheet gives you a general idea about how long it will take for you to recover. But each person recovers at a different pace. Follow the steps below to get better as quickly as possible. How can you care for yourself at home? Activity  · Rest when you feel tired. You may take a nap, but don't stay in bed all day. When you sit, use a chair with arms. You can use the arms to help you stand up. · Work with your physical therapist to find the best way to exercise. What you can do as your knee heals will depend on whether your new knee is cemented or uncemented. You may not be able to do certain things for a while if your new knee is uncemented. · After your knee has healed enough, you can do more strenuous activities with caution. ? You can golf, but use a golf cart. And don't wear shoes with spikes. ? You can bike on a flat road or on a stationary bike. Avoid biking up hills.   ? Your doctor may suggest that you stay away from activities that put stress on your knee. These include tennis, badminton, squash, racquetball, contact sports like football, jumping (such as in basketball), jogging, and running. ? Avoid activities where you might fall. These include horseback riding, skiing, and mountain biking. · Do not sit for more than 1 hour at a time. Get up and walk around for a while before you sit again. If you must sit for a long time, prop up your leg with a chair or footstool. This will help you avoid swelling. · Ask your doctor when you can drive again. It may take up to 8 weeks after knee replacement surgery before it's safe for you to drive. · When you get into a car, sit on the edge of the seat. Then pull in your legs, and turn to face the front. · You should be able to do many everyday activities 3 to 6 weeks after your surgery. You will probably need to take 4 to 16 weeks off from work. When you can go back to work depends on the type of work you do and how you feel. · Ask your doctor when it is okay for you to have sex. · For 12 weeks, do not lift anything heavier than 10 pounds and do not lift weights. Diet  · By the time you leave the hospital, you should be eating your normal diet. If your stomach is upset, try bland, low-fat foods like plain rice, broiled chicken, toast, and yogurt. Your doctor may suggest that you take iron and vitamin supplements. · Drink plenty of fluids (unless your doctor tells you not to). · Eat healthy foods, and watch your portion sizes. Try to stay at your ideal weight. Too much weight puts more stress on your new knee. · You may notice that your bowel movements are not regular right after your surgery. This is common. Try to avoid constipation and straining with bowel movements. You may want to take a fiber supplement every day. If you have not had a bowel movement after a couple of days, ask your doctor about taking a mild laxative.   Medicines  · Your doctor will tell you if and when you can restart your medicines. He or she will also give you instructions about taking any new medicines. · If you take aspirin or some other blood thinner, ask your doctor if and when to start taking it again. Make sure that you understand exactly what your doctor wants you to do. · Your doctor may give you a blood-thinning medicine to prevent blood clots. If you take a blood thinner, be sure you get instructions about how to take your medicine safely. Blood thinners can cause serious bleeding problems. This medicine could be in pill form or as a shot (injection). If a shot is needed, your doctor will tell you how to do this. · Be safe with medicines. Take pain medicines exactly as directed. ? If the doctor gave you a prescription medicine for pain, take it as prescribed. ? If you are not taking a prescription pain medicine, ask your doctor if you can take an over-the-counter medicine. ? Plan to take your pain medicine 30 minutes before exercises. It is easier to prevent pain before it starts than to stop it after it has started. · If you think your pain medicine is making you sick to your stomach:  ? Take your medicine after meals (unless your doctor has told you not to). ? Ask your doctor for a different pain medicine. · If your doctor prescribed antibiotics, take them as directed. Do not stop taking them just because you feel better. You need to take the full course of antibiotics. Incision care  · If your doctor told you how to care for your cut (incision), follow your doctor's instructions. You will have a dressing over the cut. A dressing helps the incision heal and protects it. Your doctor will tell you how to take care of this. · If you did not get instructions, follow this general advice:  ? If you have strips of tape on the cut the doctor made, leave the tape on for a week or until it falls off.  ? If you have stitches or staples, your doctor will tell you when to come back to have them removed.   ? If you have skin adhesive on the cut, leave it on until it falls off. Skin adhesive is also called glue or liquid stitches. ? Change the bandage every day. ? Wash the area daily with warm water, and pat it dry. Don't use hydrogen peroxide or alcohol. They can slow healing. ? You may cover the area with a gauze bandage if it oozes fluid or rubs against clothing. ? You may shower 24 to 48 hours after surgery. Pat the incision dry. Don't swim or take a bath for the first 2 weeks, or until your doctor tells you it is okay. Exercise  · Your rehab program will give you a number of exercises to do to help you get back your knee's range of motion and strength. Always do them as your therapist tells you. Ice  · For pain and swelling, put ice or a cold pack on the area for 10 to 20 minutes at a time. Put a thin cloth between the ice and your skin. Other instructions  · Keep wearing your support stockings as your doctor says. These help to prevent blood clots. How long you'll have to wear them depends on your activity level and the amount of swelling. · Carry a medical alert card that says you have an artificial joint. You have metal pieces in your knee. These may set off some airport metal detectors. Follow-up care is a key part of your treatment and safety. Be sure to make and go to all appointments, and call your doctor if you are having problems. It's also a good idea to know your test results and keep a list of the medicines you take. When should you call for help? [id number] anytime you think you may need emergency care. For example, call if:  · You passed out (lost consciousness). · You have severe trouble breathing. · You have sudden chest pain and shortness of breath, or you cough up blood. Call your doctor now or seek immediate medical care if:  · You have signs of infection, such as:  ? Increased pain, swelling, warmth, or redness. ? Red streaks leading from the incision. ? Pus draining from the incision. ?  A fever.  · You have signs of a blood clot, such as:  ? Pain in your calf, back of the knee, thigh, or groin. ? Redness and swelling in your leg or groin. · Your incision comes open and begins to bleed, or the bleeding increases. · You have pain that does not get better after you take pain medicine. Watch closely for changes in your health, and be sure to contact your doctor if:  · You do not have a bowel movement after taking a laxative. Where can you learn more? Go to http://pawan-raven.info/  Enter T054 in the search box to learn more about \"Total Knee Replacement: What to Expect at Home. \"  Current as of: March 2, 2020               Content Version: 12.5  © 8876-4733 [employer]. Care instructions adapted under license by [employer] (which disclaims liability or warranty for this information). If you have questions about a medical condition or this instruction, always ask your healthcare professional. [place] [address] any warranty or liability for your use of this information.

## 2020-07-13 NOTE — PROGRESS NOTES
0730:Received verbal bedside report from off going nurse OLVIN Meléndez Patient care received. Patient alert and oriented x 4. Patient resting in bed denies pain. Patient stable. Call light with in reach bed in lowest position.

## 2020-07-13 NOTE — PROGRESS NOTES
Occupational Therapy Screening:  Services are not indicated at this time. An  Occupational Therapy evaluation orders received. Patient was admitted with fever. Pt s/p TKA (Dr. Tyler Rod). The patients chart was reviewed and contacted patient via telephone. Patient reports he has been walking around his room and is able to perform self-care. Pt states he was able to get in/out of his shower prior to admit. Pt denies OT needs at this time and is being discharged. Recommended he consult HHOT if needs arise. Will sign off. Thank you.   Myrtle Ash, OTR/L, CSRS, CDCS

## 2020-07-13 NOTE — ROUTINE PROCESS
Dual AVS reviewed with SHAWNEE Jane. All medications reviewed individually with patient. Opportunities for questions and concerns provided. Patient discharged via (mode of transport ie. Car, ambulance or air transport) Car  Patient's arm band appropriately discarded.

## 2020-07-13 NOTE — ADT AUTH CERT NOTES
Sepsis and Other Febrile Illness, without Focal Infection - Care Day 2- (7/12/2020) by Jennifer Sotelo RN         Review Status    Completed        Criteria Review       Care Day: 2 Care Date: 7/12/2020 Level of Care: Inpatient Floor    Guideline Day 1    Level Of Care    (X) ICU [F] or floor    7/13/2020 16:15:02 EDT by Costella Levo      medical    Clinical Status    (X) * Clinical Indications met [G]    7/13/2020 16:15:02 EDT by Wendi Mcghee failed op treatment, T 100.7 on arrival; on po abx s/p TKA    Activity    (X) Activity as tolerated    7/13/2020 16:15:02 EDT by Costella Levo      up ad titi    Routes    (X) IV fluids, parenteral medications    7/13/2020 16:15:02 EDT by Costella Levo      attached    (X) Diet as tolerated    7/13/2020 16:15:02 EDT by Costella Levo      reg    Interventions    (X) WBC, cultures, chemistries, urinalysis, CXR, other imaging as indicated    7/13/2020 16:15:02 EDT by Wendi Mcghee attached    (X) Evaluation for source of fever [H]    7/13/2020 16:15:02 EDT by Wendi Mcghee covid pending; MRI knee    Medications    (X) Possible antimicrobial treatment    7/13/2020 16:15:02 EDT by Costella Levo      IV Vanc & cefepime    (X) Possible DVT prophylaxis    7/13/2020 16:15:02 EDT by Costella Levo      SCDs    * Milestone    Additional Notes    Day 2    7/12/20       Subjective:         Has no new complaints this AM.    Resting comfortably.     Wants to elevate his leg       Exam:         General: Well developed, alert, NAD, OX3    Head/Neck: NCAT, supple, No masses, No lymphadenopathy    CVS:Regular rate and rhythm, no M/R/G, S1/S2 heard, no thrill    Lungs:Clear to auscultation bilaterally, no wheezes, rhonchi, or rales    Abdomen: Soft, Nontender, No distention, Normal Bowel sounds, No hepatomegaly    Extremities: edematous right knee, limited ROM due to recent surgery, 2+ pedal pulses with, compression stocking in place    Skin:normal texture and turgor, no rashes, no lesions    Neuro:grossly normal , follows commands    Psych:appropriate       Assess/Plan    72 y. o. male with past medical history of RA and recent (2 days ago) left total knee replacement presents to the emergency department complaining of fever.          Fever -    No apparent source, possible po-op fever    Follow cultures    Orthopaedic surgery consult -advised admission, antibiotics and MRI    MRI still pending    Continue Vancomycin and Cefepime         COVID-19 suspect -    Low suspicion    Vitamin c, vitamin d, zinc and melatonin    Low dose AC if ok with ortho    Hold decadron          Rheumatoid Arthritis -    Has been off methotrexate since before surgery         DVT Prophyalxis -SCDs, will start AC if ok with Ortho               Ortho Consult    FUO                BC pending, COVID-19 pending, no leukocytosis, UA neg, chest CT neg for PE, PVL neg for DVT.             Continue with management per hospitalist.         S/P L TKR                Significant effusion, incision was inspected and did not show erythema, drainages, or bleeding.                MRI pending                Low dose AC OK                WBAT, up AD ELVER, PT and OT to ambulate. VS  T 99.2  P 61  /61  R 18  99% RA       MRI L Knee IMPRESSION:         Expected postoperative appearance of recent left knee arthroplasty. No secondary findings to suggest cellulitis, septic arthritis or active infection. CTA  Chest IMPRESSION:         No evidence of a pulmonary embolism or aortic dissection.     Ectasia of the ascending thoracic aorta 3.8 cm.        Labs    WBC  5.9    Hgb 10.1       SARS-CoV-2  pending       Meds    Cefepime 2 g IV q8h, Vancomycin 1250 mg IV q12h, Vit C 500 mg po bid, Vit D 2000 units po daily, Zinc sulfate po daily       Up ad elver Regular diet, SCDs             LOC:Acute Adult-Infection: Sepsis - CARE DAY 1- (7/11/2020) by Fabiola Salinas RN         Review Entered  Review Status    7/12/2020 16:46  In Primary        Criteria Review    REVIEW SUMMARY     Patient: Priscilla High  Review Number: [id number]  Review Status: In Primary     Condition Specific: Yes     Condition Level Of Care Code: ACUTE  Condition Level Of Care Description: Acute        OUTCOMES  Outcome Type: Primary           REVIEW DETAILS     Service Date: 07/11/2020  Admit Date: 07/11/2020  Product: Galina Gonzalez Adult  Subset: Infection: Sepsis      (Symptom or finding within 24h)         (Excludes PO medications unless noted)          Select Day, One:              [ ] Episode Day 1, One:                  [ ] ACUTE, One:                      [ ] Systemic infection (excludes viral) and, All:                      ~--Admin, IQ Admin Admin on 07- 04:46 PM--~                      Review Admission  7/11/20                      Medical                                                                                                              Labs                      Ca 8.2, alb 3.2                                                                                                               SCD, bld cx                                                                   Attending MD                                                                                                               72 y.o. male with past medical history of total knee replacement 2 days ago by Dr. Victoria Tucker RA presents to the emergency department complaining of fever. Patient was told to call his orthopaedic surgeon's office if he developed a fever. He noticed that his temperature was elevated this AM. His home health nurse took his temperature and it was 100.5. She told him to watch his temperature and if it reached 101 to call Dr. Patricio Alan office. His temperature reached 101, he called Dr. Patricio Alan office and he was advised to go to the ED.  After his surgery, he said that he was discharged home on aspirin, Percocet, and cephalosporin. Kalyan Sargent does have a history of RA and takes methotrexate which he discontinued the day prior to surgery and was instructed to stay off of it for 2 weeks following.  Patient denies any cough, chest pain, knee pain, shortness of breath.                         Assessment/Plan                                             Fever -                      No apparent source                      Immunocompromised state                      Lactic acid wnl                      UA neg                      No leukocytosis                       CTA chest to eval for possible PE, neg                      Duplex studies to eval for DVT, neg for bilateral DVTs                      COVID-19 test pending, very low suspicion                      Blood cultures pending                      Orthopaedic surgery consult -advised admission, antibiotics and MRI                       MRI pending                       Continue Vancomycin and Cefepime                                              COVID-19 suspect -                      Low suspicion                       Will start vitamin c, vitamin d, zinc and melatonin                       Low dose AC if ok with ortho                      Hold decadron                                              Rheumatoid Arthritis -                      Has been off methotrexate since before surgery                                              DVT Prophyalxis -SCDs, will start AC if ok with Ortho                                                                   General:      Awake, cooperative, no distress. Head:          Normocephalic, without obvious abnormality, atraumatic. Eyes:          Conjunctivae/corneas clear, sclera anicteric, PERRL, EOMs intact. Nose:          Nares normal. No drainage or sinus tenderness. Throat:        Lips, mucosa, and tongue normal.                       Neck:          Supple, symmetrical, trachea midline, no adenopathy.                              Lungs:         Clear to auscultation bilaterally.                                                   Heart:          Regular rate and rhythm, S1, S2 normal, no murmur, click, rub or gallop. Abdomen: Soft, non-tender. Bowel sounds normal. No masses,  No organomegaly. Extremities:  Edematous right knee, limited ROM due to pain and recent surgery. Incision C/D/I                      Pulses:        2+ and symmetric all extremities. Skin:           Skin color as appropriate for ethnicity, turgor normal. No rashes or lesions                      Neurologic: CNII-XII intact. No focal motor or sensory deficit.                                                                   ED Encounter                      Pt ambulated into er with complaints of fever x 1 day. Pt reports that he had total knee replacement done on 7/9/2020. Pt states that was told to come in by ortho on call due to fever being 101F                                                                                            [ ] Source unknown, All:                              [X] Chest x-ray, no pulmonary infiltrate                              ~--Admin, IQ Admin Admin on 07- 04:44 PM--~                              cxr                              No acute cardiopulmonary process. CTA chest                              No evidence of a pulmonary embolism or aortic dissection. Ectasia of the ascending thoracic aorta 3.8 cm.                                                                                                                         [ ] Physical exam negative or inconclusive                              ~--Admin, IQ Admin Admin on 07- 04:43 PM--~                              possilbe infection from left total knee replacmetn pending MRI                                                                                                                    [ ] Sign or symptom, >= Two:                              [X] Temperature, >= One:                                  [X] > 99.4°F(37.4°C) PO                                  ~--Admin, IQ Admin Admin on 07- 04:45 PM--~                                  .7 HR 84, rr 18, 149/59, r/a 100%                                                                                                                                    [ ] WBC, >= One:                                  [ ] > 12,000/cu.mm(12x10 exp 9/L)                                  ~--Admin, IQ Admin Admin on 07- 04:45 PM--~                                  Wbc 6, h/h 10.2/32.2, neutro 76,                                                                                                                                [X] Anti-infective                          ~--Admin, IQ Admin Admin on 07- 04:46 PM--~                          Meds                          Iv maxipime 2gm q8h,                           Iv vancomycin 1250 mg q12h                                                    ED Meds                          Iv maxipime 2gm x1,                           Iv vancomycin 1250 mg x1                          Po tylenol 1gm x                                                                                   Version: InterQual® 2019  [employer] Dilip and InterQual®  © 2019 [employer] and/or one of its [place]. All Rights Reserved. CPT only © 2018 American [employer] Association. All Rights Reserved.       [place]ov[place] pending by Fabiola Salinas RN         Review Entered  Review Status    7/12/2020 16:35  In Primary        Criteria Review    Is the illness suspected to be related to the Coronavirus (COVID-19)? Low suspicion  Has the member been tested for the COVID-19?    Yes  If Yes, what are the results of the COVID-19? SARS-CoV-2  PENDING        Incomplete    Specimen source  Nasopharyngeal         Final       What is the severity of the members condition (i.e. Isolation, Ventilator use)? Droplet plus precautions, on room air       H&P Notes      H&P by Hector Samano MD at 07/11/20 2127 documented on ED to Hosp-Admission (Discharged) from 7/11/2020 in [place] [address] CARDIAC/MEDICAL   Author:  Hector Samano MD  Author Type:  Physician  Filed:  07/12/20 0248    Note Status:  Addendum  Cosign:  Cosign Not Required  Date of Service:  07/11/20 2127    :  Hector Samano MD (Physician)    Prior Versions:  1. Hector Samano MD (Physician) at 07/11/20 2245 - Signed    Expand All Collapse All      History & Physical     Patient: Ana Holland MRN: [id number]  CSN: [id number]    YOB: 1955  Age: 72 y.o.   Sex: male       DOA: 7/11/2020  Primary Care Provider:  Evelyn Khan MD        Assessment/Plan   72 y.o. male with past medical history of RA and recent (2 days ago) left total knee replacement presents to the emergency department complaining of fever.      Fever -  No apparent source  Immunocompromised state  Lactic acid wnl  UA neg  No leukocytosis   CTA chest to eval for possible PE, neg  Duplex studies to eval for DVT, neg for bilateral DVTs  COVID-19 test pending, very low suspicion  Blood cultures pending  Orthopaedic surgery consult -advised admission, antibiotics and MRI   MRI pending   Continue Vancomycin and Cefepime      COVID-19 suspect -  Low suspicion   Will start vitamin c, vitamin d, zinc and melatonin   Low dose AC if ok with ortho  Hold decadron      Rheumatoid Arthritis -  Has been off methotrexate since before surgery      DVT Prophyalxis -SCDs, will start AC if ok with Ortho              Patient Active Problem List   Diagnosis Code    Osteoarthritis of left knee M17.12    Fever R50.9     Estimated length of stay : 2-3 days     CC: Fever       HPI:      Shannan Ruano is a 72 y.o. male with past medical history of total knee replacement 2 days ago by Dr. Chaz Lewis RA presents to the emergency department complaining of fever. Patient was told to call his orthopaedic surgeon's office if he developed a fever. He noticed that his temperature was elevated this AM. His home health nurse took his temperature and it was 100.5. She told him to watch his temperature and if it reached 101 to call Dr. Graciela Noriega office. His temperature reached 101, he called Dr. Graciela Noriega office and he was advised to go to the ED. After his surgery, he said that he was discharged home on aspirin, Percocet, and cephalosporin. Lata Osorio does have a history of RA and takes methotrexate which he discontinued the day prior to surgery and was instructed to stay off of it for 2 weeks following.  Patient denies any cough, chest pain, knee pain, shortness of breath.             Past Medical History:   Diagnosis Date    Arthritis      Autoimmune disease ([facility] [address]       rheumatoid arthritis    Osteoarthritis of left knee 7/8/2020              Past Surgical History:   Procedure Laterality Date    HX ORTHOPAEDIC   2019     right carpal tunnel release     HX ROTATOR CUFF REPAIR   2014     left        History reviewed. No pertinent family history.     Social History               Socioeconomic History    Marital status:        Spouse name: Not on file    Number of children: Not on file    Years of education: Not on file    Highest education level: Not on file   Tobacco Use    Smoking status: Former Smoker    Smokeless tobacco: Never Used   Substance and Sexual Activity    Alcohol use: Yes       Alcohol/week: 4.0 standard drinks       Types: 4 Cans of beer per week    Drug use: Never                   Prior to Admission medications    Medication Sig Start Date End Date Taking?  Authorizing Provider   aspirin delayed-release 81 mg tablet Take 1 Tab by mouth two (2) times a day for 21 days. 20   [facility] SEBASTIAN MIGUEL   meloxicam (Mobic) 7.5 mg tablet Take 1 Tab by mouth two (2) times a day for 14 days. 20   [facility]   oxyCODONE-acetaminophen (PERCOCET) 5-325 mg per tablet Take 1 Tab by mouth every four (4) hours as needed for Pain for up to 7 days. Max Daily Amount: 6 Tabs. 20   Russ [facility] LAMONT PA   cefadroxil (DURICEF) 500 mg capsule Take 1 Cap by mouth two (2) times a day for 5 days. 20   [facility]   cholecalciferol (Vitamin D3) 25 mcg (1,000 unit) cap Take 1,000 Units by mouth daily.       Provider, Historical   folic acid (FOLVITE) 1 mg tablet Take 1 mg by mouth daily.       Provider, Historical             Allergies   Allergen Reactions    Sulfa (Sulfonamide Antibiotics) Diarrhea        Review of Systems  Gen: +fever, no chills, malaise, weight loss/gain. Heent: No headache, rhinorrhea, epistaxis, ear pain, hearing loss, sinus pain, neck pain/stiffness, sore throat. Heart: No chest pain, palpitations, ASTORGA, pnd, or orthopnea. Resp: No cough, hemoptysis, wheezing and shortness of breath. GI: No nausea, vomiting, diarrhea, constipation, melena or hematochezia. : No urinary obstruction, dysuria or hematuria. Derm: No rash, new skin lesion or pruritis. Musc/skeletal: no bone or joint complains. Vasc: No edema, cyanosis or claudication. Endo: No heat/cold intolerance, no polyuria,polydipsia or polyphagia. Neuro: No unilateral weakness, numbness, tingling. No seizures. Heme: No easy bruising or bleeding.              Physical Exam:      Physical Exam:  Visit Vitals  /72   Pulse 67   Temp 99.4 °F (37.4 °C)   Resp 14   Ht 5' 9\" (1.753 m)   Wt 77.1 kg (170 lb)   SpO2 99%   BMI 25.10 kg/m²            Temp (24hrs), Av.1 °F (37.8 °C), Min:99.4 °F (37.4 °C), Max:100.7 °F (38.2 °C)    No intake/output data recorded.    No intake/output data recorded.     General:  Awake, cooperative, no distress. Head:  Normocephalic, without obvious abnormality, atraumatic. Eyes:  Conjunctivae/corneas clear, sclera anicteric, PERRL, EOMs intact. Nose: Nares normal. No drainage or sinus tenderness. Throat: Lips, mucosa, and tongue normal.    Neck: Supple, symmetrical, trachea midline, no adenopathy.         Lungs:   Clear to auscultation bilaterally.         Heart:  Regular rate and rhythm, S1, S2 normal, no murmur, click, rub or gallop. Abdomen: Soft, non-tender. Bowel sounds normal. No masses,  No organomegaly. Extremities:  Edematous right knee, limited ROM due to pain and recent surgery. Incision C/D/I   Pulses: 2+ and symmetric all extremities. Skin: Skin color as appropriate for ethnicity, turgor normal. No rashes or lesions   Neurologic: CNII-XII intact. No focal motor or sensory deficit.            Labs Reviewed:  Recent Results         Recent Results (from the past 24 hour(s))   CBC WITH AUTOMATED DIFF     Collection Time: 07/11/20  3:00 PM   Result Value Ref Range     WBC 6.0 4.6 - 13.2 K/uL     RBC 3.10 (L) 4.70 - 5.50 M/uL     HGB 10.2 (L) 13.0 - 16.0 g/dL     HCT 32.2 (L) 36.0 - 48.0 %     .9 (H) 74.0 - 97.0 FL     MCH 32.9 24.0 - 34.0 PG     MCHC 31.7 31.0 - 37.0 g/dL     RDW 13.1 11.6 - 14.5 %     PLATELET 187 617 - 129 K/uL     MPV 9.7 9.2 - 11.8 FL     NEUTROPHILS 75 (H) 40 - 73 %     LYMPHOCYTES 7 (L) 21 - 52 %     MONOCYTES 17 (H) 3 - 10 %     EOSINOPHILS 1 0 - 5 %     BASOPHILS 0 0 - 2 %     ABS. NEUTROPHILS 4.5 1.8 - 8.0 K/UL     ABS. LYMPHOCYTES 0.4 (L) 0.9 - 3.6 K/UL     ABS. MONOCYTES 1.0 0.05 - 1.2 K/UL     ABS. EOSINOPHILS 0.1 0.0 - 0.4 K/UL     ABS.  BASOPHILS 0.0 0.0 - 0.1 K/UL     RBC COMMENTS NORMOCYTIC, NORMOCHROMIC       DF MANUAL     METABOLIC PANEL, COMPREHENSIVE     Collection Time: 07/11/20  3:00 PM   Result Value Ref Range     Sodium 138 136 - 145 mmol/L     Potassium 3.8 3.5 - 5.5 mmol/L     Chloride 105 100 - 111 mmol/L     CO2 29 21 - 32 mmol/L     Anion gap 4 3.0 - 18 mmol/L     Glucose 110 (H) 74 - 99 mg/dL     BUN 8 7.0 - 18 MG/DL     Creatinine 0.88 0.6 - 1.3 MG/DL     BUN/Creatinine ratio 9 (L) 12 - 20       GFR est AA >60 >60 ml/min/1.73m2     GFR est non-AA >60 >60 ml/min/1.73m2     Calcium 8.2 (L) 8.5 - 10.1 MG/DL     Bilirubin, total 0.9 0.2 - 1.0 MG/DL     ALT (SGPT) 25 16 - 61 U/L     AST (SGOT) 35 10 - 38 U/L     Alk.  phosphatase 38 (L) 45 - 117 U/L     Protein, total 5.8 (L) 6.4 - 8.2 g/dL     Albumin 3.2 (L) 3.4 - 5.0 g/dL     Globulin 2.6 2.0 - 4.0 g/dL     A-G Ratio 1.2 0.8 - 1.7     LACTIC ACID     Collection Time: 07/11/20  3:00 PM   Result Value Ref Range     Lactic acid 1.1 0.4 - 2.0 MMOL/L   PROTHROMBIN TIME + INR     Collection Time: 07/11/20  3:00 PM   Result Value Ref Range     Prothrombin time 13.4 11.5 - 15.2 sec     INR 1.0 0.8 - 1.2     PTT     Collection Time: 07/11/20  3:00 PM   Result Value Ref Range     aPTT 27.6 23.0 - 36.4 SEC   URINALYSIS W/ RFLX MICROSCOPIC     Collection Time: 07/11/20  4:30 PM   Result Value Ref Range     Color YELLOW       Appearance CLEAR       Specific gravity 1.010 1.005 - 1.030       pH (UA) 8.0 5.0 - 8.0       Protein Negative NEG mg/dL     Glucose Negative NEG mg/dL     Ketone Negative NEG mg/dL     Bilirubin Negative NEG       Blood Negative NEG       Urobilinogen 1.0 0.2 - 1.0 EU/dL     Nitrites Negative NEG       Leukocyte Esterase Negative NEG     SARS-COV-2     Collection Time: 07/11/20  5:00 PM   Result Value Ref Range     SARS-CoV-2 PENDING       Specimen source Nasopharyngeal             Procedures/imaging: see electronic medical records for all procedures/Xrays and details which were not copied into this note but were reviewed prior to creation of Plan        CC: Gerber Childs MD

## 2020-07-13 NOTE — PROGRESS NOTES
Problem: Falls - Risk of  Goal: *Absence of Falls  Description: Document Keisha Campoverde Fall Risk and appropriate interventions in the flowsheet.   Outcome: Progressing Towards Goal  Note: Fall Risk Interventions:            Medication Interventions: Patient to call before getting OOB, Teach patient to arise slowly         History of Falls Interventions: Room close to nurse's station         Problem: Patient Education: Go to Patient Education Activity  Goal: Patient/Family Education  Outcome: Progressing Towards Goal     Problem: Pain  Goal: *Control of Pain  Outcome: Progressing Towards Goal  Goal: *PALLIATIVE CARE:  Alleviation of Pain  Outcome: Progressing Towards Goal     Problem: Patient Education: Go to Patient Education Activity  Goal: Patient/Family Education  Outcome: Progressing Towards Goal

## 2020-07-13 NOTE — PROGRESS NOTES
Progress Note        Patient: Mary Ann Olivarez MRN: [id number]  SSN: [id number]    YOB: 1955  Age: 72 y.o. Sex: male      * No surgery found * status post     Admit Date: 2020  Admit Diagnosis: Fever [R50.9]    Subjective:    Events of weekend noted  Doing well. No complaints. No SOB. No Chest Pain. No Nausea or Vomiting. No problems eating or voiding. Objective:        Temp (24hrs), Av.8 °F (37.1 °C), Min:98.5 °F (36.9 °C), Max:99.2 °F (37.3 °C)    Body mass index is 25.87 kg/m². Patient Vitals for the past 12 hrs:   BP Temp Pulse Resp SpO2 Weight   20 0353 128/68 98.6 °F (37 °C) 60 18 99 % 79.5 kg (175 lb 3.2 oz)   20 2345 117/61 99.2 °F (37.3 °C) 61 18 99 %    20 2035 151/44 98.5 °F (36.9 °C) 60 18 100 %    20 1938     100 %      Recent Labs     20  0428  20  1500   HGB 10.3*   < > 10.2*   HCT 31.6*   < > 32.2*   INR  --   --  1.0      < > 138   K 4.1   < > 3.8      < > 105   CO2 30   < > 29   BUN 9   < > 8   CREA 0.68   < > 0.88   GLU 99   < > 110*    < > = values in this interval not displayed. Physical Exam:  Vital Signs are Stable. No Acute Distress. Alert and Oriented. Negative Homans sign. Toes AROM Full. Neurovascular exam is normal.    Dressing is Clean, Dry, and Intact. Assessment/Plan:     1. Absolutely no sign of knee infection  2. Recommend d/c home today asap to avoid any more unnecessary exposure to hospital pathogens. Can follow up pending covid test at home with isolation  3.  F/u in office as scheduled    Continue PT/OT  Continue CPM/ROM    Discharge Plan: Home-today    Signed By: Gwyn Givens MD     2020

## 2020-07-13 NOTE — DISCHARGE SUMMARY
Discharge Summary    Patient: Facundo Canseco MRN: [id number]  CSN: [id number]    YOB: 1955  Age: 72 y.o. Sex: male    DOA: 7/11/2020 LOS:  LOS: 2 days   Discharge Date:      Primary Care Provider:  Lupe Lynn MD    Admission Diagnoses: Fever [R50.9]    Discharge Diagnoses:    Hospital Problems  Date Reviewed: 7/9/2020          Codes Class Noted POA    S/P TKR (total knee replacement), right ICD-10-CM: B33.935  ICD-9-CM: V43.65  7/12/2020 Yes        * (Principal) Fever of unknown origin ICD-10-CM: R50.9  ICD-9-CM: 780.60  7/11/2020 Yes        [facility] ICD-10-CM: M06.9  ICD-9-CM: 714.0  7/11/2020 Yes              Discharge Condition: stable     Discharge Medications:     Current Discharge Medication List      CONTINUE these medications which have NOT CHANGED    Details   aspirin delayed-release 81 mg tablet Take 1 Tab by mouth two (2) times a day for 21 days. Qty: 42 Tab, Refills: 0      meloxicam (Mobic) 7.5 mg tablet Take 1 Tab by mouth two (2) times a day for 14 days. Qty: 28 Tab, Refills: 0      oxyCODONE-acetaminophen (PERCOCET) 5-325 mg per tablet Take 1 Tab by mouth every four (4) hours as needed for Pain for up to 7 days. Max Daily Amount: 6 Tabs. Qty: 42 Tab, Refills: 0    Associated Diagnoses: Primary osteoarthritis of left knee      cefadroxil (DURICEF) 500 mg capsule Take 1 Cap by mouth two (2) times a day for 5 days. Qty: 10 Cap, Refills: 0      cholecalciferol (Vitamin D3) 25 mcg (1,000 unit) cap Take 1,000 Units by mouth daily. folic acid (FOLVITE) 1 mg tablet Take 1 mg by mouth daily. Procedures : none     Consults: Orthopedics      PHYSICAL EXAM   Visit Vitals  /75 (BP 1 Location: Right arm, BP Patient Position: At rest)   Pulse 66   Temp 98.9 °F (37.2 °C)   Resp 16   Ht 5' 9\" (1.753 m)   Wt 79.5 kg (175 lb 3.2 oz)   SpO2 98%   BMI 25.87 kg/m²     General: Awake, cooperative, no acute distress    HEENT: NC, Atraumatic. PERRLA, EOMI. Anicteric sclerae. Lungs:  CTA Bilaterally. No Wheezing/Rhonchi/Rales. Heart:  Regular  rhythm,  No murmur, No Rubs, No Gallops  Abdomen: Soft, Non distended, Non tender. +Bowel sounds,   Extremities: No c/c. Left knee swelling, covered with gauze   Psych:   Not anxious or agitated. Neurologic:  No acute neurological deficits. Admission HPI :   Omar Townsend is a 72 y.o. male with past medical history of total knee replacement 2 days ago by Dr. Rosalia Crook, RA presents to the emergency department complaining of fever. Patient was told to call his orthopaedic surgeon's office if he developed a fever. He noticed that his temperature was elevated this AM. His home health nurse took his temperature and it was 100.5. She told him to watch his temperature and if it reached 101 to call Dr. Kush Chua office. His temperature reached 101, he called Dr. Kush Chua office and he was advised to go to the ED. After his surgery, he said that he was discharged home on aspirin, Percocet, and cephalosporin. Nan Harris does have a history of RA and takes methotrexate which he discontinued the day prior to surgery and was instructed to stay off of it for 2 weeks following.  Patient denies any cough, chest pain, knee pain, shortness of breath. [facility] Course :  Omar Townsend is a 72 y.o. male with past medical history of total knee replacement 2 days ago by Dr. Rosalia Crook, RA was admitted due to fever. Since he was admitted, IV antibiotic and cefepime was administrated. Blood culture no growth for 2 days. CTA chest no PE, no DVT indicated from ultrasound. He remained afebrile, no leukocytosis. Dr. Rosalia Crook saw the patient, he does not think any infection from the knee surgery. He is cleared to be discharged per Dr. Rosalia Crook. Patient strongly requested to be discharged. Recommended close follow-up with Dr. Rosalia Crook. Also self quarantine at home  with covid 19 testing still pending.   He indicated a verbal understanding. Also notify pt, he will be informed if covid 19  Results is positive. Discharge planning discussed with patient, pt agrees  with the plan and no questions and concerns at this point. Activity: Activity as tolerated    Diet: Regular Diet    Follow-up: PCP and Dr. Larry Dupree in 3 days     Disposition: home     Minutes spent on discharge: 45 min       Labs: Results:       Chemistry Recent Labs     07/13/20 0428 07/12/20 0330 07/11/20  1500   GLU 99 96 110*    139 138   K 4.1 4.1 3.8    106 105   CO2 30 26 29   BUN 9 8 8   CREA 0.68 0.69 0.88   CA 8.1* 8.0* 8.2*   AGAP 4 7 4   BUCR 13 12 9*   AP  --   --  38*   TP  --   --  5.8*   ALB  --   --  3.2*   GLOB  --   --  2.6   AGRAT  --   --  1.2      CBC w/Diff Recent Labs     07/13/20 0428 07/12/20 0330 07/11/20  1500   WBC 5.5 5.9 6.0   RBC 3.11* 3.02* 3.10*   HGB 10.3* 10.1* 10.2*   HCT 31.6* 31.0* 32.2*    203 207   GRANS  --   --  75*   LYMPH  --   --  7*   EOS  --   --  1      Cardiac Enzymes No results for input(s): CPK, CKND1, JOSE in the last 72 hours. No lab exists for component: CKRMB, TROIP   Coagulation Recent Labs     07/11/20  1500   PTP 13.4   INR 1.0   APTT 27.6       Lipid Panel No results found for: CHOL, CHOLPOCT, CHOLX, CHLST, CHOLV, 512930, HDL, HDLP, LDL, LDLC, DLDLP, 667808, VLDLC, VLDL, TGLX, TRIGL, TRIGP, TGLPOCT, CHHD, CHHDX   BNP No results for input(s): BNPP in the last 72 hours. Liver Enzymes Recent Labs     07/11/20  1500   TP 5.8*   ALB 3.2*   AP 38*      Thyroid Studies No results found for: T4, T3U, TSH, TSHEXT       @micro    Significant Diagnostic Studies: Xr Knee Lt Max 2 Vws    Result Date: 7/9/2020  EXAM: Left knee, 2 views COMPARISON: None. INDICATION: Left knee pain, status post left knee arthroplasty. _______________ FINDINGS: Portable AP and crosstable lateral views of the left knee were obtained. Surgical changes and hardware of total left knee arthroplasty.  No lucency adjacent to the hardware to suggest complication. Expected postsurgical effusion, soft tissue swelling, and emphysema. No acute fracture. Adequate anatomic alignment of left knee prosthesis. _______________    IMPRESSION: Total left knee arthroplasty, without complication. Cta Chest W Or W Wo Cont    Result Date: 7/11/2020  EXAM: CTA chest INDICATION: Fever COMPARISON: None TECHNIQUE: Axial CT imaging from the thoracic inlet through the diaphragm with intravenous contrast. Coronal and sagittal MIP reformats were generated. One or more dose reduction techniques were used on this CT: automated exposure control, adjustment of the mAs and/or kVp according to patient size, and iterative reconstruction techniques. The specific techniques used on this CT exam have been documented in the patient's electronic medical record. Digital Imaging and Communications in Medicine (DICOM) format image data are available to nonaffiliated external healthcare facilities or entities on a secure, media free, reciprocally searchable basis with patient authorization for at least a 12-month period after this study. _______________ FINDINGS: EXAM QUALITY: Overall exam quality is satisfactory. Pulmonary arterial enhancement is adequate with adequate breath hold and no significant artifact. PULMONARY ARTERIES: No evidence of pulmonary embolism. LYMPH Nodes: No enlarged lymph nodes seen. PLEURA: No pleural effusion seen. HEART: Normal in size without pericardial effusion. VASCULATURE/MEDIASTINUM: There is ectasia of the ascending thoracic aorta 3.8 cm. LUNGS: No suspicious nodule or mass. No abnormal opacities. AIRWAY: Patent UPPER ABDOMEN: Unremarkable. OTHER: No acute or aggressive osseous abnormalities identified. _______________     IMPRESSION: No evidence of a pulmonary embolism or aortic dissection. Ectasia of the ascending thoracic aorta 3.8 cm.      Xr Chest Port    Result Date: 7/11/2020  EXAM: One view chest x-ray CLINICAL INDICATION/HISTORY: Postoperative fever. COMPARISON: No prior relevant study available for comparison. TECHNIQUE: Single AP view of the chest was obtained. _______________ FINDINGS: HEART, VESSELS, MEDIASTINUM: Heart size is normal. No vascular congestion. LUNGS, PLEURAL SPACES: The lungs are clear. No effusion or pneumothorax. BONY THORAX, SOFT TISSUES: Unremarkable. _______________     IMPRESSION: No acute cardiopulmonary process.             [facility]     CC: Jack Woodard MD

## 2020-07-13 NOTE — PROGRESS NOTES
1938:  Assumed care for patient, received verbal report from Leopoldo Lorenzo, RN. Patient quietly lying in bed watching television with no complaints of pain or discomfort at the time. Island dressing to left knee in place, clean, dry, and intact. Whiteboard updated, bed at the lowest position with call bell within reach.

## 2020-07-13 NOTE — PROGRESS NOTES
Pt  Strongly requests to be d/c home. He is cleared to be discharged per orthopedics. orthopedics does not think infection indicated. He stated that he has \"a - wife \"at home for his PT/ot. Recommended self quarantine at home will covid  testing still pending. He indicated a verbal understanding.

## 2020-07-13 NOTE — ADT AUTH CERT NOTES
LOC:Acute Adult-Infection: Sepsis - CARE DAY 1- (7/11/2020) by Christiano Beatty RN         Review Entered  Review Status    7/12/2020 16:46  In Primary        Criteria Review    REVIEW SUMMARY     Patient: Bo Acosta  Review Number: [id number]  Review Status: In Primary     Condition Specific: Yes     Condition Level Of Care Code: ACUTE  Condition Level Of Care Description: Acute        OUTCOMES  Outcome Type: Primary           REVIEW DETAILS     Service Date: 07/11/2020  Admit Date: 07/11/2020  Product: Anant Nu[facility]ez Adult  Subset: Infection: Sepsis      (Symptom or finding within 24h)         (Excludes PO medications unless noted)          Select Day, One:              [ ] Episode Day 1, One:                  [ ] ACUTE, One:                      [ ] Systemic infection (excludes viral) and, All:                      ~--Admin, IQ Admin Admin on 07- 04:46 PM--~                      Review Admission  7/11/20                      Medical                                                                                                              Labs                      Ca 8.2, alb 3.2                                                                                                               SCD, bld cx                                                                   Attending MD                                                                                                               72 y.o. male with past medical history of total knee replacement 2 days ago by Dr. Celia Moreno RA presents to the emergency department complaining of fever. Patient was told to call his orthopaedic surgeon's office if he developed a fever. He noticed that his temperature was elevated this AM. His home health nurse took his temperature and it was 100.5. She told him to watch his temperature and if it reached 101 to call Dr. Lizy Rosa office. His temperature reached 101, he called Dr. Lizy Rosa office and he was advised to go to the ED. After his surgery, he said that he was discharged home on aspirin, Percocet, and cephalosporin. Monica Krishna does have a history of RA and takes methotrexate which he discontinued the day prior to surgery and was instructed to stay off of it for 2 weeks following.  Patient denies any cough, chest pain, knee pain, shortness of breath.                         Assessment/Plan                                             Fever -                      No apparent source                      Immunocompromised state                      Lactic acid wnl                      UA neg                      No leukocytosis                       CTA chest to eval for possible PE, neg                      Duplex studies to eval for DVT, neg for bilateral DVTs                      COVID-19 test pending, very low suspicion                      Blood cultures pending                      Orthopaedic surgery consult -advised admission, antibiotics and MRI                       MRI pending                       Continue Vancomycin and Cefepime                                              COVID-19 suspect -                      Low suspicion                       Will start vitamin c, vitamin d, zinc and melatonin                       Low dose AC if ok with ortho                      Hold decadron                                              Rheumatoid Arthritis -                      Has been off methotrexate since before surgery                                              DVT Prophyalxis -SCDs, will start AC if ok with Ortho                                                                   General:      Awake, cooperative, no distress. Head:          Normocephalic, without obvious abnormality, atraumatic. Eyes:          Conjunctivae/corneas clear, sclera anicteric, PERRL, EOMs intact. Nose:          Nares normal. No drainage or sinus tenderness.                       Throat: Lips, mucosa, and tongue normal.                       Neck:          Supple, symmetrical, trachea midline, no adenopathy.                                                   Lungs:         Clear to auscultation bilaterally.                                                   Heart:          Regular rate and rhythm, S1, S2 normal, no murmur, click, rub or gallop. Abdomen: Soft, non-tender. Bowel sounds normal. No masses,  No organomegaly. Extremities:  Edematous right knee, limited ROM due to pain and recent surgery. Incision C/D/I                      Pulses:        2+ and symmetric all extremities. Skin:           Skin color as appropriate for ethnicity, turgor normal. No rashes or lesions                      Neurologic: CNII-XII intact. No focal motor or sensory deficit.                                                                   ED Encounter                      Pt ambulated into er with complaints of fever x 1 day. Pt reports that he had total knee replacement done on 7/9/2020. Pt states that was told to come in by ortho on call due to fever being 101F                                                                                            [ ] Source unknown, All:                              [X] Chest x-ray, no pulmonary infiltrate                              ~--Admin, IQ Admin Admin on 07- 04:44 PM--~                              cxr                              No acute cardiopulmonary process. CTA chest                              No evidence of a pulmonary embolism or aortic dissection. Ectasia of the ascending thoracic aorta 3.8 cm.                                                                                                                         [ ] Physical exam negative or inconclusive ~--Admin, IQ Admin Admin on 07- 04:43 PM--~                              possilbe infection from left total knee replacmetn pending MRI                                                                                                                    [ ] Sign or symptom, >= Two:                              [X] Temperature, >= One:                                  [X] > 99.4°F(37.4°C) PO                                  ~--Admin, IQ Admin Admin on 07- 04:45 PM--~                                  .7 HR 84, rr 18, 149/59, r/a 100%                                                                                                                                    [ ] WBC, >= One:                                  [ ] > 12,000/cu.mm(12x10 exp 9/L)                                  ~--Admin, IQ Admin Admin on 07- 04:45 PM--~                                  Wbc 6, h/h 10.2/32.2, neutro 75,                                                                                                                                [X] Anti-infective                          ~--Admin, IQ Admin Admin on 07- 04:46 PM--~                          Meds                          Iv maxipime 2gm q8h,                           Iv vancomycin 1250 mg q12h                                                    ED Meds                          Iv maxipime 2gm x1,                           Iv vancomycin 1250 mg x1                          Po tylenol 1gm x                                                                                   Version: InterQual® 2019  [employer] and [employer]®  © 2019 [employer] and/or one of its [place]. All Rights Reserved. CPT only © 2018 [employer].   All Rights Reserved.       [place] pending by Shobha Monroe RN         Review Entered  Review Status    7/12/2020 16:35  In Primary        Criteria Review    Is the illness suspected to be related to the Coronavirus (COVID-19)? Low suspicion  Has the member been tested for the COVID-19? Yes  If Yes, what are the results of the COVID-19? SARS-CoV-2  PENDING        Incomplete    Specimen source  Nasopharyngeal         Final       What is the severity of the members condition (i.e. Isolation, Ventilator use)? Droplet plus precautions, on room air       H&P Notes      H&P by Young Snowden MD at 07/11/20 2127 documented on ED to Hosp-Admission (Discharged) from 7/11/2020 in [place] [address] CARDIAC/MEDICAL   Author:  Young Snowden MD  Author Type:  Physician  Filed:  07/12/20 0248    Note Status:  Addendum  Cosign:  Cosign Not Required  Date of Service:  07/11/20 2127    :  Young Snowden MD (Physician)    Prior Versions:  1. Young Snowden MD (Physician) at 07/11/20 9961 - Signed    Expand All Collapse All      History & Physical     Patient: Clarnce Collet MRN: [id number]  CSN: [id number]    YOB: 1955  Age: 72 y.o.   Sex: male       DOA: 7/11/2020  Primary Care Provider:  Rd Camacho MD        Assessment/Plan   72 y.o. male with past medical history of RA and recent (2 days ago) left total knee replacement presents to the emergency department complaining of fever.      Fever -  No apparent source  Immunocompromised state  Lactic acid wnl  UA neg  No leukocytosis   CTA chest to eval for possible PE, neg  Duplex studies to eval for DVT, neg for bilateral DVTs  COVID-19 test pending, very low suspicion  Blood cultures pending  Orthopaedic surgery consult -advised admission, antibiotics and MRI   MRI pending   Continue Vancomycin and Cefepime      COVID-19 suspect -  Low suspicion   Will start vitamin c, vitamin d, zinc and melatonin   Low dose AC if ok with ortho  Hold decadron      Rheumatoid Arthritis -  Has been off methotrexate since before surgery      DVT Prophyalxis -SCDs, will start AC if ok with Ortho        Patient Active Problem List   Diagnosis Code    Osteoarthritis of left knee M17.12    Fever R50.9     Estimated length of stay : 2-3 days     CC: Fever       HPI:      Ana Holland is a 72 y.o. male with past medical history of total knee replacement 2 days ago by Dr. Fran Cali RA presents to the emergency department complaining of fever. Patient was told to call his orthopaedic surgeon's office if he developed a fever. He noticed that his temperature was elevated this AM. His home health nurse took his temperature and it was 100.5. She told him to watch his temperature and if it reached 101 to call Dr. Blake Mary office. His temperature reached 101, he called Dr. Blake Mary office and he was advised to go to the ED. After his surgery, he said that he was discharged home on aspirin, Percocet, and cephalosporin. Nkechi Kolb does have a history of RA and takes methotrexate which he discontinued the day prior to surgery and was instructed to stay off of it for 2 weeks following.  Patient denies any cough, chest pain, knee pain, shortness of breath.             Past Medical History:   Diagnosis Date    Arthritis      Autoimmune disease ([address]       rheumatoid arthritis    Osteoarthritis of left knee 7/8/2020              Past Surgical History:   Procedure Laterality Date    HX ORTHOPAEDIC   2019     right carpal tunnel release     HX ROTATOR CUFF REPAIR   2014     left        History reviewed. No pertinent family history.     Social History               Socioeconomic History    Marital status:        Spouse name: Not on file    Number of children: Not on file    Years of education: Not on file    Highest education level: Not on file   Tobacco Use    Smoking status: Former Smoker    Smokeless tobacco: Never Used   Substance and Sexual Activity    Alcohol use:  Yes       Alcohol/week: 4.0 standard drinks       Types: 4 Cans of beer per week    Drug use: Never                   Prior to Admission medications Medication Sig Start Date End Date Taking? Authorizing Provider   aspirin delayed-release 81 mg tablet Take 1 Tab by mouth two (2) times a day for 21 days. 20   Miesha Solano PA   meloxicam (Mobic) 7.5 mg tablet Take 1 Tab by mouth two (2) times a day for 14 days. 20   Miesha Solano PA   oxyCODONE-acetaminophen (PERCOCET) 5-325 mg per tablet Take 1 Tab by mouth every four (4) hours as needed for Pain for up to 7 days. Max Daily Amount: 6 Tabs. 20   Miesha Solano PA   cefadroxil (DURICEF) 500 mg capsule Take 1 Cap by mouth two (2) times a day for 5 days. 20   Miesha Solano PA   cholecalciferol (Vitamin D3) 25 mcg (1,000 unit) cap Take 1,000 Units by mouth daily.       Provider, Historical   folic acid (FOLVITE) 1 mg tablet Take 1 mg by mouth daily.       Provider, Historical             Allergies   Allergen Reactions    Sulfa (Sulfonamide Antibiotics) Diarrhea        Review of Systems  Gen: +fever, no chills, malaise, weight loss/gain. Heent: No headache, rhinorrhea, epistaxis, ear pain, hearing loss, sinus pain, neck pain/stiffness, sore throat. Heart: No chest pain, palpitations, ASTORGA, pnd, or orthopnea. Resp: No cough, hemoptysis, wheezing and shortness of breath. GI: No nausea, vomiting, diarrhea, constipation, melena or hematochezia. : No urinary obstruction, dysuria or hematuria. Derm: No rash, new skin lesion or pruritis. Musc/skeletal: no bone or joint complains. Vasc: No edema, cyanosis or claudication. Endo: No heat/cold intolerance, no polyuria,polydipsia or polyphagia. Neuro: No unilateral weakness, numbness, tingling. No seizures.    Heme: No easy bruising or bleeding.              Physical Exam:      Physical Exam:  Visit Vitals  /72   Pulse 67   Temp 99.4 °F (37.4 °C)   Resp 14   Ht 5' 9\" (1.753 m)   Wt 77.1 kg (170 lb)   SpO2 99%   BMI 25.10 kg/m²            Temp (24hrs), Av.1 °F (37.8 °C), Min:99.4 °F (37.4 °C), Max:100.7 °F (38.2 °C)    No intake/output data recorded. No intake/output data recorded.     General:  Awake, cooperative, no distress. Head:  Normocephalic, without obvious abnormality, atraumatic. Eyes:  Conjunctivae/corneas clear, sclera anicteric, PERRL, EOMs intact. Nose: Nares normal. No drainage or sinus tenderness. Throat: Lips, mucosa, and tongue normal.    Neck: Supple, symmetrical, trachea midline, no adenopathy.         Lungs:   Clear to auscultation bilaterally.         Heart:  Regular rate and rhythm, S1, S2 normal, no murmur, click, rub or gallop. Abdomen: Soft, non-tender. Bowel sounds normal. No masses,  No organomegaly. Extremities:  Edematous right knee, limited ROM due to pain and recent surgery. Incision C/D/I   Pulses: 2+ and symmetric all extremities. Skin: Skin color as appropriate for ethnicity, turgor normal. No rashes or lesions   Neurologic: CNII-XII intact. No focal motor or sensory deficit.            Labs Reviewed:  Recent Results         Recent Results (from the past 24 hour(s))   CBC WITH AUTOMATED DIFF     Collection Time: 07/11/20  3:00 PM   Result Value Ref Range     WBC 6.0 4.6 - 13.2 K/uL     RBC 3.10 (L) 4.70 - 5.50 M/uL     HGB 10.2 (L) 13.0 - 16.0 g/dL     HCT 32.2 (L) 36.0 - 48.0 %     .9 (H) 74.0 - 97.0 FL     MCH 32.9 24.0 - 34.0 PG     MCHC 31.7 31.0 - 37.0 g/dL     RDW 13.1 11.6 - 14.5 %     PLATELET 528 967 - 646 K/uL     MPV 9.7 9.2 - 11.8 FL     NEUTROPHILS 75 (H) 40 - 73 %     LYMPHOCYTES 7 (L) 21 - 52 %     MONOCYTES 17 (H) 3 - 10 %     EOSINOPHILS 1 0 - 5 %     BASOPHILS 0 0 - 2 %     ABS. NEUTROPHILS 4.5 1.8 - 8.0 K/UL     ABS. LYMPHOCYTES 0.4 (L) 0.9 - 3.6 K/UL     ABS. MONOCYTES 1.0 0.05 - 1.2 K/UL     ABS. EOSINOPHILS 0.1 0.0 - 0.4 K/UL     ABS.  BASOPHILS 0.0 0.0 - 0.1 K/UL     RBC COMMENTS NORMOCYTIC, NORMOCHROMIC       DF MANUAL     METABOLIC PANEL, COMPREHENSIVE     Collection Time: 07/11/20  3:00 PM   Result Value Ref Range     Sodium 138 136 - 145 mmol/L     Potassium 3.8 3.5 - 5.5 mmol/L     Chloride 105 100 - 111 mmol/L     CO2 29 21 - 32 mmol/L     Anion gap 4 3.0 - 18 mmol/L     Glucose 110 (H) 74 - 99 mg/dL     BUN 8 7.0 - 18 MG/DL     Creatinine 0.88 0.6 - 1.3 MG/DL     BUN/Creatinine ratio 9 (L) 12 - 20       GFR est AA >60 >60 ml/min/1.73m2     GFR est non-AA >60 >60 ml/min/1.73m2     Calcium 8.2 (L) 8.5 - 10.1 MG/DL     Bilirubin, total 0.9 0.2 - 1.0 MG/DL     ALT (SGPT) 25 16 - 61 U/L     AST (SGOT) 35 10 - 38 U/L     Alk.  phosphatase 38 (L) 45 - 117 U/L     Protein, total 5.8 (L) 6.4 - 8.2 g/dL     Albumin 3.2 (L) 3.4 - 5.0 g/dL     Globulin 2.6 2.0 - 4.0 g/dL     A-G Ratio 1.2 0.8 - 1.7     LACTIC ACID     Collection Time: 07/11/20  3:00 PM   Result Value Ref Range     Lactic acid 1.1 0.4 - 2.0 MMOL/L   PROTHROMBIN TIME + INR     Collection Time: 07/11/20  3:00 PM   Result Value Ref Range     Prothrombin time 13.4 11.5 - 15.2 sec     INR 1.0 0.8 - 1.2     PTT     Collection Time: 07/11/20  3:00 PM   Result Value Ref Range     aPTT 27.6 23.0 - 36.4 SEC   URINALYSIS W/ RFLX MICROSCOPIC     Collection Time: 07/11/20  4:30 PM   Result Value Ref Range     Color YELLOW       Appearance CLEAR       Specific gravity 1.010 1.005 - 1.030       pH (UA) 8.0 5.0 - 8.0       Protein Negative NEG mg/dL     Glucose Negative NEG mg/dL     Ketone Negative NEG mg/dL     Bilirubin Negative NEG       Blood Negative NEG       Urobilinogen 1.0 0.2 - 1.0 EU/dL     Nitrites Negative NEG       Leukocyte Esterase Negative NEG     SARS-COV-2     Collection Time: 07/11/20  5:00 PM   Result Value Ref Range     SARS-CoV-2 PENDING       Specimen source Nasopharyngeal             Procedures/imaging: see electronic medical records for all procedures/Xrays and details which were not copied into this note but were reviewed prior to creation of Plan        CC: Radha Martínez MD

## 2020-07-14 ENCOUNTER — HOME CARE VISIT (OUTPATIENT)
Dept: SCHEDULING | Facility: HOME HEALTH | Age: 65
End: 2020-07-14
Payer: COMMERCIAL

## 2020-07-14 VITALS
HEART RATE: 86 BPM | TEMPERATURE: 98.4 F | DIASTOLIC BLOOD PRESSURE: 72 MMHG | RESPIRATION RATE: 16 BRPM | SYSTOLIC BLOOD PRESSURE: 123 MMHG | OXYGEN SATURATION: 98 %

## 2020-07-14 PROCEDURE — 3331090002 HH PPS REVENUE DEBIT

## 2020-07-14 PROCEDURE — G0151 HHCP-SERV OF PT,EA 15 MIN: HCPCS

## 2020-07-14 PROCEDURE — 3331090001 HH PPS REVENUE CREDIT

## 2020-07-15 ENCOUNTER — HOME CARE VISIT (OUTPATIENT)
Dept: HOME HEALTH SERVICES | Facility: HOME HEALTH | Age: 65
End: 2020-07-15
Payer: COMMERCIAL

## 2020-07-15 ENCOUNTER — HOME CARE VISIT (OUTPATIENT)
Dept: SCHEDULING | Facility: HOME HEALTH | Age: 65
End: 2020-07-15
Payer: COMMERCIAL

## 2020-07-15 VITALS
RESPIRATION RATE: 14 BRPM | OXYGEN SATURATION: 99 % | DIASTOLIC BLOOD PRESSURE: 81 MMHG | SYSTOLIC BLOOD PRESSURE: 129 MMHG | HEART RATE: 70 BPM | TEMPERATURE: 100.6 F

## 2020-07-15 VITALS
TEMPERATURE: 100.2 F | OXYGEN SATURATION: 99 % | DIASTOLIC BLOOD PRESSURE: 81 MMHG | HEART RATE: 70 BPM | SYSTOLIC BLOOD PRESSURE: 129 MMHG

## 2020-07-15 LAB
SARS-COV-2, COV2NT: NOT DETECTED
SOURCE, COVRS: NORMAL

## 2020-07-15 PROCEDURE — G0157 HHC PT ASSISTANT EA 15: HCPCS

## 2020-07-15 PROCEDURE — A6213 FOAM DRG >16<=48 SQ IN W/BDR: HCPCS

## 2020-07-15 PROCEDURE — 3331090001 HH PPS REVENUE CREDIT

## 2020-07-15 PROCEDURE — G0299 HHS/HOSPICE OF RN EA 15 MIN: HCPCS

## 2020-07-15 PROCEDURE — 3331090002 HH PPS REVENUE DEBIT

## 2020-07-15 NOTE — ADDENDUM NOTE
Addendum  created 07/15/20 2767 by Gordon Garcia MD    Clinical Note Signed, Diagnosis association updated, Intraprocedure Blocks edited

## 2020-07-16 PROCEDURE — 3331090002 HH PPS REVENUE DEBIT

## 2020-07-16 PROCEDURE — 3331090001 HH PPS REVENUE CREDIT

## 2020-07-17 ENCOUNTER — HOME CARE VISIT (OUTPATIENT)
Dept: HOME HEALTH SERVICES | Facility: HOME HEALTH | Age: 65
End: 2020-07-17
Payer: COMMERCIAL

## 2020-07-17 ENCOUNTER — HOME CARE VISIT (OUTPATIENT)
Dept: SCHEDULING | Facility: HOME HEALTH | Age: 65
End: 2020-07-17
Payer: COMMERCIAL

## 2020-07-17 LAB
BACTERIA SPEC CULT: NORMAL
BACTERIA SPEC CULT: NORMAL
SERVICE CMNT-IMP: NORMAL
SERVICE CMNT-IMP: NORMAL

## 2020-07-17 PROCEDURE — 3331090002 HH PPS REVENUE DEBIT

## 2020-07-17 PROCEDURE — G0157 HHC PT ASSISTANT EA 15: HCPCS

## 2020-07-17 PROCEDURE — 3331090001 HH PPS REVENUE CREDIT

## 2020-07-18 PROCEDURE — 3331090002 HH PPS REVENUE DEBIT

## 2020-07-18 PROCEDURE — 3331090001 HH PPS REVENUE CREDIT

## 2020-07-19 ENCOUNTER — HOME CARE VISIT (OUTPATIENT)
Dept: SCHEDULING | Facility: HOME HEALTH | Age: 65
End: 2020-07-19
Payer: COMMERCIAL

## 2020-07-19 VITALS
TEMPERATURE: 99.1 F | HEART RATE: 66 BPM | RESPIRATION RATE: 17 BRPM | OXYGEN SATURATION: 97 % | DIASTOLIC BLOOD PRESSURE: 86 MMHG | SYSTOLIC BLOOD PRESSURE: 150 MMHG

## 2020-07-19 PROCEDURE — 3331090002 HH PPS REVENUE DEBIT

## 2020-07-19 PROCEDURE — 3331090001 HH PPS REVENUE CREDIT

## 2020-07-19 PROCEDURE — G0299 HHS/HOSPICE OF RN EA 15 MIN: HCPCS

## 2020-07-20 ENCOUNTER — HOME CARE VISIT (OUTPATIENT)
Dept: SCHEDULING | Facility: HOME HEALTH | Age: 65
End: 2020-07-20
Payer: COMMERCIAL

## 2020-07-20 PROCEDURE — G0157 HHC PT ASSISTANT EA 15: HCPCS

## 2020-07-20 PROCEDURE — 3331090002 HH PPS REVENUE DEBIT

## 2020-07-20 PROCEDURE — 3331090001 HH PPS REVENUE CREDIT

## 2020-07-21 VITALS
DIASTOLIC BLOOD PRESSURE: 78 MMHG | HEART RATE: 75 BPM | OXYGEN SATURATION: 98 % | SYSTOLIC BLOOD PRESSURE: 148 MMHG | TEMPERATURE: 98.6 F

## 2020-07-21 VITALS
TEMPERATURE: 100 F | HEART RATE: 68 BPM | SYSTOLIC BLOOD PRESSURE: 134 MMHG | DIASTOLIC BLOOD PRESSURE: 73 MMHG | OXYGEN SATURATION: 97 %

## 2020-07-21 PROCEDURE — 3331090002 HH PPS REVENUE DEBIT

## 2020-07-21 PROCEDURE — 3331090001 HH PPS REVENUE CREDIT

## 2020-07-22 ENCOUNTER — HOME CARE VISIT (OUTPATIENT)
Dept: SCHEDULING | Facility: HOME HEALTH | Age: 65
End: 2020-07-22
Payer: COMMERCIAL

## 2020-07-22 VITALS
SYSTOLIC BLOOD PRESSURE: 135 MMHG | DIASTOLIC BLOOD PRESSURE: 72 MMHG | OXYGEN SATURATION: 98 % | HEART RATE: 80 BPM | TEMPERATURE: 98.5 F | RESPIRATION RATE: 16 BRPM

## 2020-07-22 PROCEDURE — G0151 HHCP-SERV OF PT,EA 15 MIN: HCPCS

## 2020-07-22 PROCEDURE — 3331090002 HH PPS REVENUE DEBIT

## 2020-07-22 PROCEDURE — 3331090001 HH PPS REVENUE CREDIT

## (undated) DEVICE — GARMENT COMPR M FOR 13IN FT INTMIT SGL BLDR HEM FORC II

## (undated) DEVICE — SOL IRRIGATION INJ NACL 0.9% 500ML BTL

## (undated) DEVICE — SOLUTION IV 100ML 0.9% SOD CHL DIL INJ

## (undated) DEVICE — ZIP 16 SURGICAL SKIN CLOSURE DEVICE: Brand: ZIP 16 SURGICAL SKIN CLOSURE DEVICE

## (undated) DEVICE — BLADE SAW W13XL90MM 1.19MM PARA

## (undated) DEVICE — SOLUTION SCRB 4OZ 10% PVP I POVIDONE IOD TOP PAINT EXIDINE

## (undated) DEVICE — NEEDLE SPNL 20GA L3.5IN YEL HUB S STL REG WALL FIT STYL W/

## (undated) DEVICE — NDL SPNE QNCKE 18GX3.5IN LF --

## (undated) DEVICE — HANDPIECE SET WITH HIGH FLOW TIP AND SUCTION TUBE: Brand: INTERPULSE

## (undated) DEVICE — DRSG MEPILES BORDER AG 4X12 -- 5/BX

## (undated) DEVICE — BLADE SAW 1.19X20X90 MM FOR LG BNE

## (undated) DEVICE — 3 BONE CEMENT MIXER: Brand: MIXEVAC

## (undated) DEVICE — SUTURE STRATAFIX SYMMETRIC PDS + 1 SGL ARMED CT 18 IN LEN SXPP1A405

## (undated) DEVICE — PIN GUIDE FIX 3.2X62 MM SCREW [GS903A0620322P] [KOMET MEDICAL]

## (undated) DEVICE — SUT VCRL + 1 36IN CT1 VIO --

## (undated) DEVICE — SUT VCRL + 2-0 36IN CT1 UD --

## (undated) DEVICE — THE CANADY HYBRID PLASMA SCALPEL IS AN ELECTROSURGICAL PLASMA SCALPEL THAT USES AN 85MM BENDABLE PADDLE BLADE TIP. THE ELECTROSURGICAL PLASMA SCALPEL IS USED TO SIMULTANEOUSLY CUT AND COAGULATE BIOLOGICAL TISSUE.: Brand: CANADY HYBRID PLASMA PADDLE BLADE

## (undated) DEVICE — PIN GUIDE FIX 3.2X62 MM SCREW [GS9030620324P] [KOMET MEDICAL]

## (undated) DEVICE — Device

## (undated) DEVICE — SOL INJ L R 1000ML BG --